# Patient Record
Sex: FEMALE | Race: BLACK OR AFRICAN AMERICAN | NOT HISPANIC OR LATINO | Employment: FULL TIME | URBAN - METROPOLITAN AREA
[De-identification: names, ages, dates, MRNs, and addresses within clinical notes are randomized per-mention and may not be internally consistent; named-entity substitution may affect disease eponyms.]

---

## 2016-04-07 LAB — PAP-ABSTRACT: NORMAL

## 2017-09-26 ENCOUNTER — OFFICE VISIT (OUTPATIENT)
Dept: URGENT CARE | Facility: RETAIL CLINIC | Age: 32
End: 2017-09-26
Payer: MEDICAID

## 2017-09-26 VITALS — SYSTOLIC BLOOD PRESSURE: 118 MMHG | TEMPERATURE: 98.6 F | DIASTOLIC BLOOD PRESSURE: 81 MMHG | HEART RATE: 87 BPM

## 2017-09-26 DIAGNOSIS — H65.02 ACUTE SEROUS OTITIS MEDIA OF LEFT EAR, RECURRENCE NOT SPECIFIED: Primary | ICD-10-CM

## 2017-09-26 PROCEDURE — 99203 OFFICE O/P NEW LOW 30 MIN: CPT | Performed by: PHYSICIAN ASSISTANT

## 2017-09-26 NOTE — PATIENT INSTRUCTIONS
Use Flonase as directed- 2 sprays in each nostril daily until symptoms resolve then continue with 1 spray in each nostril daily for 5 more days.  Ibuprofen as needed for pain relief and to reduce inflammation.  Warm compresses next to ear for pain relief.  Massage behind ear to encourage drainage.  Drink plenty of fluids and place a humidifier in bedroom.  Sudafed behind the pharmacist counter helps to dry up fluid in ears.  Follow up with primary care provider in 10-14 days with any concern of persistent pain.

## 2017-09-26 NOTE — MR AVS SNAPSHOT
"              After Visit Summary   2017    Kena Serna    MRN: [id number]           Patient Information     Date Of Birth          1985        Visit Information        Provider Department      2017 5:40 PM Deandra Santizo PA-C [facility]        Today's Diagnoses     Acute serous otitis media of left ear, recurrence not specified    -  1      Care Instructions    Use Flonase as directed- 2 sprays in each nostril daily until symptoms resolve then continue with 1 spray in each nostril daily for 5 more days.  Ibuprofen as needed for pain relief and to reduce inflammation.  Warm compresses next to ear for pain relief.  Massage behind ear to encourage drainage.  Drink plenty of fluids and place a humidifier in bedroom.  Sudafed behind the pharmacist counter helps to dry up fluid in ears.  Follow up with primary care provider in 10-14 days with any concern of persistent pain.          Follow-ups after your visit        Who to contact     You can reach your care team any time of the day by calling [phone].  Notification of test results:  If you have an abnormal lab result, we will notify you by phone as soon as possible.         Additional Information About Your Visit        [employer] Information     [employer] lets you send messages to your doctor, view your test results, renew your prescriptions, schedule appointments and more. To sign up, go to [email or web address] . Click on \"Log in\" on the left side of the screen, which will take you to the Welcome page. Then click on \"Sign up Now\" on the right side of the page.     You will be asked to enter the access code listed below, as well as some personal information. Please follow the directions to create your username and password.     Your access code is: [email or web address]  Expires: 2017  5:55 PM     Your access code will  in 90 days. If you need help or a new code, please call your [place] clinic or [phone].      "   Care EveryWhere ID     This is your Care EveryWhere ID. This could be used by other organizations to access your [facility] medical records  [id number]        Your Vitals Were     Pulse Temperature                87 98.6  F (37  C) (Temporal)           Blood Pressure from Last 3 Encounters:   09/26/17 118/81    Weight from Last 3 Encounters:   No data found for Wt              Today, you had the following     No orders found for display       Primary Care Provider    None Specified       No primary provider on file.        Equal Access to Services     : Hadii aad ku hadasho Soomaali, waaxda luqadaha, qaybta kaalmada adeegyada, waxay idiin hayaan adeeg leandrojuan figueroa . So [facility] [phone].    ATENCIÓN: Si habla español, tiene a foy disposición servicios gratuitos de asistencia lingüística. Llame al [phone].    We comply with applicable federal civil rights laws and [place] laws. We do not discriminate on the basis of race, color, national origin, age, disability sex, sexual orientation or gender identity.            Thank you!     Thank you for choosing [facility]  for your care. Our goal is always to provide you with excellent care. Hearing back from our patients is one way we can continue to improve our services. Please take a few minutes to complete the written survey that you may receive in the mail after your visit with us. Thank you!             Your Updated Medication List - Protect others around you: Learn how to safely use, store and throw away your medicines at www.disposemymeds.org.          This list is accurate as of: 9/26/17  5:55 PM.  Always use your most recent med list.                   Brand Name Dispense Instructions for use Diagnosis    CLARITIN PO           DAYQUIL OR           etonogestrel 68 MG Impl    IMPLANON/NEXPLANON     1 each by Subdermal route once        NYQUIL PO           ZOLOFT PO      Take by mouth daily

## 2017-09-26 NOTE — NURSING NOTE
Chief Complaint   Patient presents with     Otalgia     left ear pain x 5 days, sinus drainage since this last weekend, no fevers       Initial /81 (BP Location: Left arm)  Pulse 87  Temp 98.6  F (37  C) (Temporal) There is no height or weight on file to calculate BMI.  Medication Reconciliation: complete

## 2017-09-26 NOTE — PROGRESS NOTES
Chief Complaint   Patient presents with     Otalgia     left ear pain x 5 days, sinus drainage since this last weekend, no fevers     SUBJECTIVE:  Kena Serna is a 32 year old female who presents for evaluation of left ear pain for 5 days.   Severity: moderate   Timing: gradual onset  Additional symptoms include nasal congestion and post nasal drip.  Treatment measures tried include: Tylenol/Ibuprofen and Claritin.  History of recurrent otitis: no  Predisposing factors include: None.    No past medical history on file.  Current Outpatient Prescriptions   Medication Sig Dispense Refill     etonogestrel (IMPLANON/NEXPLANON) 68 MG IMPL 1 each by Subdermal route once       Sertraline HCl (ZOLOFT PO) Take by mouth daily       Loratadine (CLARITIN PO)        Pseudoeph-Doxylamine-DM-APAP (NYQUIL PO)        Pseudoephedrine-APAP-DM (DAYQUIL OR)        Social History   Substance Use Topics     Smoking status: Not on file     Smokeless tobacco: Not on file     Alcohol use Not on file     Allergies   Allergen Reactions     Ceclor [Cefaclor]      ROS:   Review of systems negative except as stated above.    OBJECTIVE:  /81 (BP Location: Left arm)  Pulse 87  Temp 98.6  F (37  C) (Temporal)   GENERAL: awake alert and in no acute distress  EARS:   Right TM in neutral position, translucent without scarring, effusion or erythema. Normal landmarks are visible. Auditory canal without drainage, edema, erythema.  Left TM in neutral position, translucent without scarring, with moderate serous/mucoid effusion with erythema. Normal landmarks are visible. Auditory canal without drainage, edema, erythema.  ENT: EOMI,  PERRL, conjunctiva clear. Nares bilaterally without edematous turbinates or discharge. Posterior pharynx is not erythematous.  NECK: supple, non-tender to palpation, no adenopathy noted.   RESP: lungs clear to auscultation - no rales, rhonchi or wheezes  CV: regular rates and rhythm, normal S1 S2, no murmur  noted    ASSESSMENT:    ICD-10-CM    1. Acute serous otitis media of left ear, recurrence not specified H65.02      PLAN:   Patient Instructions   Use Flonase as directed- 2 sprays in each nostril daily until symptoms resolve then continue with 1 spray in each nostril daily for 5 more days.  Ibuprofen as needed for pain relief and to reduce inflammation.  Warm compresses next to ear for pain relief.  Massage behind ear to encourage drainage.  Drink plenty of fluids and place a humidifier in bedroom.  Sudafed behind the pharmacist counter helps to dry up fluid in ears.  Follow up with primary care provider in 10-14 days with any concern of persistent pain.    Follow up with primary care provider with any problems, questions or concerns or if symptoms worsen or fail to improve. Patient agreed to plan and verbalized understanding.    Trisha Jungers, PA-C  [facility]

## 2017-11-08 ENCOUNTER — THERAPY VISIT (OUTPATIENT)
Dept: PHYSICAL THERAPY | Facility: CLINIC | Age: 32
End: 2017-11-08
Payer: COMMERCIAL

## 2017-11-08 DIAGNOSIS — M54.50 CHRONIC LEFT-SIDED LOW BACK PAIN WITHOUT SCIATICA: Primary | ICD-10-CM

## 2017-11-08 DIAGNOSIS — G89.29 CHRONIC LEFT-SIDED LOW BACK PAIN WITHOUT SCIATICA: Primary | ICD-10-CM

## 2017-11-08 PROCEDURE — 97110 THERAPEUTIC EXERCISES: CPT | Mod: GP | Performed by: PHYSICAL THERAPIST

## 2017-11-08 PROCEDURE — 97161 PT EVAL LOW COMPLEX 20 MIN: CPT | Mod: GP | Performed by: PHYSICAL THERAPIST

## 2017-11-08 NOTE — PROGRESS NOTES
[facility] Initial Evaluation      Subjective:    Patient is a 32 year old female presenting with rehab back hpi.   Kena Serna is a 32 year old female with a lumbar condition.  Occurance: pain began 4 years ago after having a difficutl pregnancy.  Condition occurred: other.  This is a chronic condition  11/3/17 date of MD order.    Patient reports pain:  Thoracic spine left and lumbar spine left.  Radiates to:  Gluteals left.  Quality: throbbing. and is constant and reported as 8/10.  Associated symptoms:  Loss of motion/stiffness. Pain is worse in the A.M. and worse during the day.  Exacerbated by: sitting for longer than 20 minutes, standing for longer than 20minutes. any prolonged position.  and relieved by nothing.  Since onset symptoms are gradually worsening.  Special tests:  X-ray (no signficant findings).  Previous treatment includes chiropractic (acupuncture, prednisone).  There was no improvement following previous treatment.    Pertinent medical history includes:  Depression and asthma.  Medical allergies: no.  Other surgeries include:  Other (wisdom teeth).  Current medications:  Anti-depressants and anti-inflammatory.  Current occupation is -.  Patient is working in normal job without restrictions.  Primary job tasks include:  Prolonged sitting and prolonged standing (computer work).    Barriers include:  None as reported by the patient.    Red flags:  None as reported by the patient.                        Objective:    Standing Alignment:          Pelvic:  PSIS high L                         Lumbar/SI Evaluation    Lumbar Myotomes:  normal            Lumbar DTR's:    L4 (Quad):  Left:  3   Right:  3  S1 (Achilles):  Left:  3   Right:  3    Lumbar Dermtomes:  not assessed (no reports of numbness tingling in legs. )                      Lumbar Provocation:    Left positive with:  PROM hip    Right negative with:  PROM hip    SI joint/Sacrum:        Left positive  at:    Thigh thrust and Sacral thrust    Right negative at:  Thigh thrust and Sacral thrust                                                   Clifford Lumbar Evaluation      Movement Loss:  Flexion (Flex): mod and pain  Extension (EXT): min  Side Glide R (SG R): nil  Side Glide L (SG L): min and pain  Test Movements:  FIS: During: increases  After: no worse  Pretest Movements: 5/10 left / central low back / SI pain,   Repeat FIS: During: increases  After: worse        EIL: During: increases  After: no worse    Repeat EIL: During: increases  After: no worse                                                   ROS    Assessment/Plan:      Patient is a 32 year old female with lumbar and sacral complaints.    Patient has the following significant findings with corresponding treatment plan.                Diagnosis 1:  Chronic left low back / SI pain  Pain -  hot/cold therapy, manual therapy, self management, education, directional preference exercise and home program  Decreased ROM/flexibility - manual therapy, therapeutic exercise, therapeutic activity and home program  Decreased joint mobility - manual therapy, therapeutic exercise, therapeutic activity and home program  Decreased strength - therapeutic exercise, therapeutic activities and home program  Decreased function - therapeutic activities and home program  Impaired posture - neuro re-education, therapeutic activities and home program    Therapy Evaluation Codes:   1) History comprised of:   Personal factors that impact the plan of care:      Time since onset of symptoms.    Comorbidity factors that impact the plan of care are:      Depression.     Medications impacting care: Anti-depressant.  2) Examination of Body Systems comprised of:   Body structures and functions that impact the plan of care:      Lumbar spine and Sacral illiac joint.   Activity limitations that impact the plan of care are:      Lifting, Sitting, Stairs, Walking and Working.  3) Clinical  presentation characteristics are:   Stable/Uncomplicated.  4) Decision-Making    Low complexity using standardized patient assessment instrument and/or measureable assessment of functional outcome.  Cumulative Therapy Evaluation is: Low complexity.    Previous and current functional limitations:  (See Goal Flow Sheet for this information)    Short term and Long term goals: (See Goal Flow Sheet for this information)     Communication ability:  Patient appears to be able to clearly communicate and understand verbal and written communication and follow directions correctly.  Treatment Explanation - The following has been discussed with the patient:   RX ordered/plan of care  Anticipated outcomes  Possible risks and side effects  This patient would benefit from PT intervention to resume normal activities.   Rehab potential is good.    Frequency:  2 X week, once daily  Duration:  for 6 weeks  Discharge Plan:  Achieve all LTG.  Independent in home treatment program.  Reach maximal therapeutic benefit.    Please refer to the daily flowsheet for treatment today, total treatment time and time spent performing 1:1 timed codes.

## 2017-11-08 NOTE — LETTER
[facility]  [address]  [facility] [address] [phone]    2017    Re: Kena Serna   :   1985  MRN:  [id number]   REFERRING PHYSICIAN:   Aj Harris    [facility]  Date of Initial Evaluation:  17  Visits:  Rxs Used: 1  Reason for Referral:  Chronic left-sided low back pain without sciatica    EVALUATION SUMMARY    [facility]    Subjective:    Patient is a 32 year old female presenting with rehab back hpi.   Kena Serna is a 32 year old female with a lumbar condition.  Occurance: pain began 4 years ago after having a difficutl pregnancy.  Condition occurred: other.  This is a chronic condition  11/3/17 date of MD order.    Patient reports pain:  Thoracic spine left and lumbar spine left.  Radiates to:  Gluteals left.  Quality: throbbing. and is constant and reported as 8/10.  Associated symptoms:  Loss of motion/stiffness. Pain is worse in the A.M. and worse during the day.  Exacerbated by: sitting for longer than 20 minutes, standing for longer than 20minutes. any prolonged position.  and relieved by nothing.  Since onset symptoms are gradually worsening.  Special tests:  X-ray (no signficant findings).  Previous treatment includes chiropractic (acupuncture, prednisone).  There was no improvement following previous treatment.    Pertinent medical history includes:  Depression and asthma.  Medical allergies: no.  Other surgeries include:  Other (wisdom teeth).  Current medications:  Anti-depressants and anti-inflammatory.  Current occupation is -.  Patient is working in normal job without restrictions.  Primary job tasks include:  Prolonged sitting and prolonged standing (computer work).    Barriers include:  None as reported by the patient.  Red flags:  None as reported by the patient.                  Objective:    Standing Alignment:    Pelvic:  PSIS high L       Lumbar/SI Evaluation    Lumbar Myotomes:  normal    Lumbar DTR's:    L4 (Quad):  Left:  3   Right:  3  S1 (Achilles):  Left:  3   Right:  3    Lumbar Dermtomes:  not assessed (no reports of numbness tingling in legs. )    Lumbar Provocation:    Left positive with:  PROM hip  Right negative with:  PROM hip    SI joint/Sacrum:      Left positive at:    Thigh thrust and Sacral thrust  Right negative at:  Thigh thrust and Sacral thrust    Clifford Lumbar Evaluation    Movement Loss:  Flexion (Flex): mod and pain  Extension (EXT): min  Side Glide R (SG R): nil  Side Glide L (SG L): min and pain    Test Movements:  FIS: During: increases  After: no worse  Pretest Movements: 5/10 left / central low back / SI pain,   Repeat FIS: During: increases  After: worse    EIL: During: increases  After: no worse    Repeat EIL: During: increases  After: no worse      Assessment/Plan:      Patient is a 32 year old female with lumbar and sacral complaints.    Patient has the following significant findings with corresponding treatment plan.                Diagnosis 1:  Chronic left low back / SI pain  Pain -  hot/cold therapy, manual therapy, self management, education, directional preference exercise and home program  Decreased ROM/flexibility - manual therapy, therapeutic exercise, therapeutic activity and home program  Decreased joint mobility - manual therapy, therapeutic exercise, therapeutic activity and home program  Decreased strength - therapeutic exercise, therapeutic activities and home program  Decreased function - therapeutic activities and home program  Impaired posture - neuro re-education, therapeutic activities and home program  Therapy Evaluation Codes:   1) History comprised of:   Personal factors that impact the plan of care:      Time since onset of symptoms.    Comorbidity factors that impact the plan of care are:      Depression.     Medications  impacting care: Anti-depressant.  2) Examination of Body Systems comprised of:   Body structures and functions that impact the plan of care:      Lumbar spine and Sacral illiac joint.   Activity limitations that impact the plan of care are:      Lifting, Sitting, Stairs, Walking and Working.  3) Clinical presentation characteristics are:   Stable/Uncomplicated.  4) Decision-Making    Low complexity using standardized patient assessment instrument and/or measureable assessment of functional outcome.  Cumulative Therapy Evaluation is: Low complexity.    Previous and current functional limitations:  (See Goal Flow Sheet for this information)    Short term and Long term goals: (See Goal Flow Sheet for this information)     Communication ability:  Patient appears to be able to clearly communicate and understand verbal and written communication and follow directions correctly.  Treatment Explanation - The following has been discussed with the patient:   RX ordered/plan of care  Anticipated outcomes  Possible risks and side effects  This patient would benefit from PT intervention to resume normal activities.   Rehab potential is good.    Frequency:  2 X week, once daily  Duration:  for 6 weeks  Discharge Plan:  Achieve all LTG.  Independent in home treatment program.  Reach maximal therapeutic benefit.      Thank you for your referral.    INQUIRIES  Therapist: Khoi Mark DPT  [facility]  [address] [place], [place] [address]  [facility] [address]  Phone: [phone]  Fax: [phone]

## 2017-11-08 NOTE — LETTER
[facility]  [address]  [facility] [id number] [phone]    2018    Re: Kena Serna   :   1985  MRN:  [id number]   REFERRING PHYSICIAN:   Aj Harris    [facility]  Date of Initial Evaluation:  17  Visits:  Rxs Used: 1  Reason for Referral:  Chronic left-sided low back pain without sciatica    DISCHARGE REPORT  Patient was seen in physical therapy for evaluation of low back pain on 17. Following initial visit, patient did not schedule any follow-up appointments so current status is unknown.  Will consequently discharge from physical therapy.      SUBJECTIVE  See Eval    Current Pain level: 10.     Initial Pain level: /10.   Changes in function:  unknown  Adverse reaction to treatment or activity: None    OBJECTIVE  See Eval     ASSESSMENT/PLAN  Updated problem list and treatment plan: Diagnosis 1:  Low back pain   STG/LTGs have been met or progress has been made towards goals:  unknown  Assessment of Progress: The patient has not returned to therapy. Current status is unknown.  Self Management Plans:  Patient has been instructed in a home treatment program.  I have re-evaluated this patient and find that the nature, scope, duration and intensity of the therapy is appropriate for the medical condition of the patient.  Kena continues to require the following intervention to meet STG and LTG's:  HEP    Recommendations:  Discharge Physical Therapy with home exercise program.       Thank you for your referral.    INQUIRIES  Therapist: Khoi Mark DPT  [facility]  [address]  [facility] [address]  Phone: [phone]  Fax: [phone]

## 2017-11-08 NOTE — MR AVS SNAPSHOT
After Visit Summary   11/8/2017    Kena Serna    MRN: [id number]           Patient Information     Date Of Birth          1985        Visit Information        Provider Department      11/8/2017 5:20 PM Khoi Mark, PT [facility]        Today's Diagnoses     Chronic left-sided low back pain without sciatica    -  1       Follow-ups after your visit        Your next 10 appointments already scheduled     Nov 10, 2017 11:40 AM CST   DIANA Spine with Jim Sumner PT   [facility] ([facility]  )    [address]  [place] [address]   [phone]            Nov 13, 2017  4:50 PM CST   DIANA Spine with Jim Sumner PT   [facility] ([facility]  )    [address]  [place] [address]   [phone]            Nov 22, 2017  2:40 PM CST   DIANA Spine with Khoi Mark, PT   [facility] ([facility]  )    [address]  [place] [address]   [phone]            Nov 24, 2017 10:20 AM CST   DIANA Spine with Jim Sumner PT   [facility] ([facility]  )    [address]  [place] [address]   [phone]              Who to contact     If you have questions or need follow up information about today's clinic visit or your schedule please contact [facility] PHYSICAL [facility]ERAPY directly at [phone].  Normal or non-critical lab and imaging results will be communicated to you by MyChart, letter or phone within 4 business days after the clinic has received the results. If you do not hear from us within 7 days, please contact the clinic through MyChart or phone. If you have a critical or abnormal lab result, we will notify you by phone as soon  "as possible.  Submit refill requests through [employer] or call your pharmacy and they will forward the refill request to us. Please allow 3 business days for your refill to be completed.          Additional Information About Your Visit        [employer] Information     [employer] lets you send messages to your doctor, view your test results, renew your prescriptions, schedule appointments and more. To sign up, go to [email or web address] . Click on \"Log in\" on the left side of the screen, which will take you to the Welcome page. Then click on \"Sign up Now\" on the right side of the page.     You will be asked to enter the access code listed below, as well as some personal information. Please follow the directions to create your username and password.     Your access code is: [email or web address]  Expires: 2017  4:55 PM     Your access code will  in 90 days. If you need help or a new code, please call your [place] clinic or [phone].        Care EveryWhere ID     This is your Care EveryWhere ID. This could be used by other organizations to access your [place] medical records  [id number]         Blood Pressure from Last 3 Encounters:   17 118/81    Weight from Last 3 Encounters:   No data found for Wt              We Performed the Following     HC PT EVAL, LOW COMPLEXITY     DIANA INITIAL EVAL REPORT     THERAPEUTIC EXERCISES        Primary Care Provider    None Specified       No primary provider on file.        Equal Access to Services     [facility] HospitalBRIANA : Hadii marcus Thomas, waaxda bere, qaybta boaldaren aguero . So [facility] [phone].    ATENCIÓN: Si habla español, tiene a foy disposición servicios gratuitos de asistencia lingüística. Samuel al [phone].    We comply with applicable federal civil rights laws and [place] laws. We do not discriminate on the basis of race, color, national origin, age, disability, sex, sexual orientation, or gender " identity.            Thank you!     Thank you for choosing [facility]  for your care. Our goal is always to provide you with excellent care. Hearing back from our patients is one way we can continue to improve our services. Please take a few minutes to complete the written survey that you may receive in the mail after your visit with us. Thank you!             Your Updated Medication List - Protect others around you: Learn how to safely use, store and throw away your medicines at www.disposemymeds.org.          This list is accurate as of: 11/8/17  6:35 PM.  Always use your most recent med list.                   Brand Name Dispense Instructions for use Diagnosis    CLARITIN PO           DAYQUIL OR           etonogestrel 68 MG Impl    IMPLANON/NEXPLANON     1 each by Subdermal route once        NYQUIL PO           ZOLOFT PO      Take by mouth daily

## 2017-12-14 ENCOUNTER — OFFICE VISIT (OUTPATIENT)
Dept: OBGYN | Facility: OTHER | Age: 32
End: 2017-12-14
Payer: COMMERCIAL

## 2017-12-14 VITALS — HEART RATE: 72 BPM | WEIGHT: 179.5 LBS | SYSTOLIC BLOOD PRESSURE: 120 MMHG | DIASTOLIC BLOOD PRESSURE: 74 MMHG

## 2017-12-14 DIAGNOSIS — Z30.46 NEXPLANON REMOVAL: Primary | ICD-10-CM

## 2017-12-14 DIAGNOSIS — Z30.017 NEXPLANON INSERTION: ICD-10-CM

## 2017-12-14 DIAGNOSIS — Z97.5 NEXPLANON IN PLACE: ICD-10-CM

## 2017-12-14 PROCEDURE — 11983 REMOVE/INSERT DRUG IMPLANT: CPT | Performed by: ADVANCED PRACTICE MIDWIFE

## 2017-12-14 NOTE — PROGRESS NOTES
NEXPLANON INSERTION PROCEDURE    Kena Serna is a 32 year old No obstetric history on file. who presents for Nexplanon insertion. Patient's last menstrual period was 12/11/2017 (exact date).  The patient is currently using Nexplanon  for contraception.     Tests:   Discussed risks of bleeding and infection with placement and the insertion procedure. Also discussed the possibility of irregular bleeding for 3-6 months and then often cessation of menses but possibility of continued abnormal bleeding. Small risk of migration of the Nexplanon or difficulty removing the Nexplanon. We also discussed possible side effects of weight gain, skin or hair changes, alterations in mood and increase in headaches.  Lasts for 3 years at which time she could have this one removed and another replaced. All questions answered and consent form signed.   Preprocedure medications: 1% plain lidocaine, 1-2 ml  Nexplanon Lot # [id number]  [id number]   [id number]      Exp date:11/19   NDC 8473-0652-18    All equipment required was ready and available.  Patients allergies were confirmed.  The patient was placed in the supine position with her left (non-dominant) arm flexed at the elbow, externally rotated, and placed with her wrist parallel to her ear.  The removal site was identified 6-8 cm above the elbow crease at the inner aspect overlying the bicepital groove.  The removal and reinsertion site was marked with a sterile marker. The direction of insertion was also indicated with a nevin 6-8 cm proximal in the bicepital groove.  The  area was cleaned with betadine swabs and anesthetized with 3 cc of 1% lidocaine without epinephrine.  A small skin incision made using a #11 blade.  The Nexplanon was gently manipulated until the tip was at the incision. The brown tip was grasped with a  Felicity clamp and was gently removed from the incision.  Both brown tips were inspected following removal and found to be completely intact.    The Nexplanon was  removed from its blister.  The needle shield was removed.  Counter-traction was applied to the skin at the marked needle insertion site.  The tip of the needle was inserted at the site, beveled side up, at a slight angle.  The applicator was then lowered to a horizontal position.  The needle was inserted to its full length, keeping the needle parallel to the surface of the skin and the skin tented.   The cannula was retracted against the obturator.  The 4 cm brown was palpated under the skin.  The patient also palpated the brown.  A pressure bandage was applied with sterile gauze. The patient was instructed to remove the bangage in several hours and replace with a band-aid.    The user card was filled out and given to the patient to keep.    PLAN:   The patient was asked to contact the clinic for any fever/chills/severe pelvic or abdominal pain or heavy bleeding.     FOLLOW-UP:  She was asked to follow up for any problems.

## 2017-12-14 NOTE — PATIENT INSTRUCTIONS
Leave the pressure dressing in place for 4-6 hours.  If you feel the dressing is too tight loosen it or remove it completely.  Leave the Band-Aid in place for 24 hours.  Change it if wet.   Leave the steri strip in place until it falls off by itself.  Call the clinic with fever, chills or bleeding from the site.   Use a back up method of birth control such as condoms or abstain from intercourse for 7 days.   Call the clinic for bleeding that is heavier than a normal period for you or if you experience severe pelvic pain.      What Nexplanon Users May Expect    For appropiate patients, Nexplanon is well tolerated and has a low early-removal rate.    Insertion site complications, such as prolonged pain or infection, are rare. Removal is occasionally difficult, and rarely requires a surgical procedure in the operating room.    Menstrual changes are common with Nexplanon. Bleeding may become more or less frequent or heavy, or absent. The bleeding pattern after the first three months is predictive of future bleeding, but the pattern may change at any time. Average bleeding is 18 days over 3 months. Over 50% of women experience rare over absent bleeding over the two year period, while 25% experience frequent or prolonged bleeding.    In clinical studies, users gained 3.7 pounds over two years. It is unknown what portion of this weight gain is related to Nexplanon    Women with a history of depressed mood may have worsening on Nexplanon, and may need to have the device removed.    Return to baseline ovulation patterns is seen 7-14 days after removal of Nexplanon.    Rarely, headaches and acne have also let to device removal.    Nexplanon may be less effective in women weight more than 130% of their ideal body weight.    Nexplanon does not protect against HIV or STDs.    Please call [facility] at [phone] if you have questions or concerns.    For more complete  information:  [email or web address]

## 2017-12-14 NOTE — MR AVS SNAPSHOT
After Visit Summary   12/14/2017    Kena Serna    MRN: [id number]           Patient Information     Date Of Birth          1985        Visit Information        Provider Department      12/14/2017 5:15 PM Ellie Boucher APRN [facility]        Today's Diagnoses     Nexplanon removal    -  1    Nexplanon insertion        Nexplanon in place          Care Instructions    Leave the pressure dressing in place for 4-6 hours.  If you feel the dressing is too tight loosen it or remove it completely.  Leave the Band-Aid in place for 24 hours.  Change it if wet.   Leave the steri strip in place until it falls off by itself.  Call the clinic with fever, chills or bleeding from the site.   Use a back up method of birth control such as condoms or abstain from intercourse for 7 days.   Call the clinic for bleeding that is heavier than a normal period for you or if you experience severe pelvic pain.      What Nexplanon Users May Expect    For appropiate patients, Nexplanon is well tolerated and has a low early-removal rate.    Insertion site complications, such as prolonged pain or infection, are rare. Removal is occasionally difficult, and rarely requires a surgical procedure in the operating room.    Menstrual changes are common with Nexplanon. Bleeding may become more or less frequent or heavy, or absent. The bleeding pattern after the first three months is predictive of future bleeding, but the pattern may change at any time. Average bleeding is 18 days over 3 months. Over 50% of women experience rare over absent bleeding over the two year period, while 25% experience frequent or prolonged bleeding.    In clinical studies, users gained 3.7 pounds over two years. It is unknown what portion of this weight gain is related to Nexplanon    Women with a history of depressed mood may have worsening on Nexplanon, and may need to have the device removed.    Return to baseline ovulation  "patterns is seen 7-14 days after removal of Nexplanon.    Rarely, headaches and acne have also let to device removal.    Nexplanon may be less effective in women weight more than 130% of their ideal body weight.    Nexplanon does not protect against HIV or STDs.    Please call [facility] at [phone] if you have questions or concerns.    For more complete information:  [email or web address]              Follow-ups after your visit        Who to contact     If you have questions or need follow up information about today's clinic visit or your schedule please contact [facility] directly at [phone].  Normal or non-critical lab and imaging results will be communicated to you by [employer], letter or phone within 4 business days after the clinic has received the results. If you do not hear from us within 7 days, please contact the clinic through [employer] or phone. If you have a critical or abnormal lab result, we will notify you by phone as soon as possible.  Submit refill requests through [employer] or call your pharmacy and they will forward the refill request to us. Please allow 3 business days for your refill to be completed.          Additional Information About Your Visit        [employer] Information     [employer] lets you send messages to your doctor, view your test results, renew your prescriptions, schedule appointments and more. To sign up, go to [email or web address] . Click on \"Log in\" on the left side of the screen, which will take you to the Welcome page. Then click on \"Sign up Now\" on the right side of the page.     You will be asked to enter the access code listed below, as well as some personal information. Please follow the directions to create your username and password.     Your access code is: [email or web address]  Expires: 2017  4:55 PM     Your access code will  in 90 days. If you need help or a new code, please call " your [place] clinic or [phone].        Care EveryWhere ID     This is your Care EveryWhere ID. This could be used by other organizations to access your [place] medical records  [id number]        Your Vitals Were     Pulse Last Period                72 12/11/2017 (Exact Date)           Blood Pressure from Last 3 Encounters:   12/14/17 120/74   09/26/17 118/81    Weight from Last 3 Encounters:   12/14/17 179 lb 8 oz (81.4 kg)              We Performed the Following     ETONOGESTREL IMPLANT SYSTEM     REMOVAL AND REINSERTION NEXPLANON          Today's Medication Changes          These changes are accurate as of: 12/14/17  5:34 PM.  If you have any questions, ask your nurse or doctor.               These medicines have changed or have updated prescriptions.        Dose/Directions    * etonogestrel 68 MG Impl   Commonly known as:  IMPLANON/NEXPLANON   This may have changed:  Another medication with the same name was added. Make sure you understand how and when to take each.   Changed by:  Deandra Santizo PA-C        Dose:  1 each   1 each by Subdermal route once   Refills:  0       * etonogestrel 68 MG Impl   Commonly known as:  IMPLANON/NEXPLANON   This may have changed:  You were already taking a medication with the same name, and this prescription was added. Make sure you understand how and when to take each.   Used for:  Nexplanon removal, Nexplanon insertion, Nexplanon in place   Changed by:  Ellie Boucher APRN CNM        Dose:  1 each   1 each (68 mg) by Subdermal route once for 1 dose   Quantity:  1 each   Refills:  0       * Notice:  This list has 2 medication(s) that are the same as other medications prescribed for you. Read the directions carefully, and ask your doctor or other care provider to review them with you.         Where to get your medicines      Some of these will need a paper prescription and others can be bought over the counter.  Ask your nurse if you have questions.     You  don't need a prescription for these medications     etonogestrel 68 MG Impl                Primary Care Provider Office Phone # Fax #    [facility] [phone]       [address] [place] [address]        Equal Access to Services     ELENA BELL : Hadbrayden knutson hadjohno Soomaali, waaxda luqadaha, qaybta kaalmada adeegyada, waxjesus idiin hayblazen adesharon love carolina gusman. So [facility] [phone].    ATENCIÓN: Si habla español, tiene a foy disposición servicios gratuitos de asistencia lingüística. Llame al [phone].    We comply with applicable federal civil rights laws and [place] laws. We do not discriminate on the basis of race, color, national origin, age, disability, sex, sexual orientation, or gender identity.            Thank you!     Thank you for choosing [facility]  for your care. Our goal is always to provide you with excellent care. Hearing back from our patients is one way we can continue to improve our services. Please take a few minutes to complete the written survey that you may receive in the mail after your visit with us. Thank you!             Your Updated Medication List - Protect others around you: Learn how to safely use, store and throw away your medicines at www.disposemymeds.org.          This list is accurate as of: 12/14/17  5:34 PM.  Always use your most recent med list.                   Brand Name Dispense Instructions for use Diagnosis    CLARITIN PO           CYMBALTA PO      Take 90 mg by mouth daily        DAYQUIL OR           * etonogestrel 68 MG Impl    IMPLANON/NEXPLANON     1 each by Subdermal route once        * etonogestrel 68 MG Impl    IMPLANON/NEXPLANON    1 each    1 each (68 mg) by Subdermal route once for 1 dose    Nexplanon removal, Nexplanon insertion, Nexplanon in place       NYQUIL PO           ZOLOFT PO      Take by mouth daily        * Notice:  This list has 2 medication(s) that are the same as other medications  prescribed for you. Read the directions carefully, and ask your doctor or other care provider to review them with you.

## 2017-12-14 NOTE — NURSING NOTE
Chief Complaint   Patient presents with     Contraception     remove and replace Nexplanon       Initial /74 (BP Location: Right arm, Patient Position: Chair, Cuff Size: Adult Regular)  Pulse 72  Wt 179 lb 8 oz (81.4 kg)  LMP 12/11/2017 (Exact Date) There is no height or weight on file to calculate BMI.  Medication Reconciliation: complete   Juanita Bradley CMA

## 2018-01-23 PROBLEM — G89.29 CHRONIC LEFT-SIDED LOW BACK PAIN WITHOUT SCIATICA: Status: RESOLVED | Noted: 2017-11-08 | Resolved: 2018-01-23

## 2018-01-23 PROBLEM — M54.50 CHRONIC LEFT-SIDED LOW BACK PAIN WITHOUT SCIATICA: Status: RESOLVED | Noted: 2017-11-08 | Resolved: 2018-01-23

## 2018-01-23 NOTE — PROGRESS NOTES
Subjective:  HPI  Oswestry Score: 50 %                 Objective:  System    Physical Exam    General     ROS    Assessment/Plan:    DISCHARGE REPORT    Patient was seen in physical therapy for evaluation of low back pain on 11/8/17. Following initial visit, patient did not schedule any follow-up appointments so current status is unknown.  Will consequently discharge from physical therapy.        SUBJECTIVE  See Eval    Current Pain level: 5/10.     Initial Pain level: 7/10.   Changes in function:  unknown  Adverse reaction to treatment or activity: None    OBJECTIVE  See Eval     ASSESSMENT/PLAN  Updated problem list and treatment plan: Diagnosis 1:  Low back pain   STG/LTGs have been met or progress has been made towards goals:  unknown  Assessment of Progress: The patient has not returned to therapy. Current status is unknown.  Self Management Plans:  Patient has been instructed in a home treatment program.  I have re-evaluated this patient and find that the nature, scope, duration and intensity of the therapy is appropriate for the medical condition of the patient.  Kena continues to require the following intervention to meet STG and LTG's:  HEP    Recommendations:  Discharge Physical Therapy with home exercise program.       Please refer to the daily flowsheet for treatment today, total treatment time and time spent performing 1:1 timed codes.

## 2018-03-28 ENCOUNTER — OFFICE VISIT (OUTPATIENT)
Dept: ORTHOPEDICS | Facility: OTHER | Age: 33
End: 2018-03-28
Payer: COMMERCIAL

## 2018-03-28 ENCOUNTER — RADIANT APPOINTMENT (OUTPATIENT)
Dept: GENERAL RADIOLOGY | Facility: OTHER | Age: 33
End: 2018-03-28
Attending: ORTHOPAEDIC SURGERY
Payer: COMMERCIAL

## 2018-03-28 VITALS — TEMPERATURE: 97.1 F | WEIGHT: 190 LBS | HEIGHT: 62 IN | BODY MASS INDEX: 34.96 KG/M2

## 2018-03-28 DIAGNOSIS — M79.644 PAIN OF FINGER OF RIGHT HAND: ICD-10-CM

## 2018-03-28 DIAGNOSIS — M25.641 STIFFNESS OF FINGER JOINT, RIGHT: Primary | ICD-10-CM

## 2018-03-28 PROCEDURE — 73140 X-RAY EXAM OF FINGER(S): CPT | Mod: RT

## 2018-03-28 PROCEDURE — 99203 OFFICE O/P NEW LOW 30 MIN: CPT | Performed by: ORTHOPAEDIC SURGERY

## 2018-03-28 ASSESSMENT — PAIN SCALES - GENERAL: PAINLEVEL: MODERATE PAIN (5)

## 2018-03-28 NOTE — PROGRESS NOTES
Kena Serna is a 32 year old female who is seen in consultation at the request of .  History of Present illness:  Kena presents for evaluation of:  1.) rt hand pinky finger  Onset: has always been like this but got worse in the last 2 months  Symptoms brought on by maybe over use.   Character:  dull ache.    Progression of symptoms:  worse.    Previous similar pain: no .   Pain Level:  5/10.   Previous treatments:  rest/inactivity, ice, massage, stretching, Ibuprofen and oral prednisone.  Currently on Blood thinners? no  Diagnosis of Diabetes? no

## 2018-03-28 NOTE — NURSING NOTE
".  Chief Complaint   Patient presents with     Consult     rt hand pinky finger        Initial Temp 97.1  F (36.2  C)  Ht 1.575 m (5' 2\")  Wt 86.2 kg (190 lb)  BMI 34.75 kg/m2 Estimated body mass index is 34.75 kg/(m^2) as calculated from the following:    Height as of this encounter: 1.575 m (5' 2\").    Weight as of this encounter: 86.2 kg (190 lb).  Medication Reconciliation: complete    BP completed using cuff size: NA (Not Taken)    Mikaela Malik MA      "

## 2018-03-28 NOTE — PATIENT INSTRUCTIONS
Encounter Diagnoses   Name Primary?     Pain of finger of right hand      Stiffness of finger joint, right Yes     Rest, ice and elevate above heart level as needed for pain control  1.  X-ray: We can see from x-ray that you have some calcification or bone growth over the second joint in your pinky finger on the palm side of your hand.  2.  On exam I can actually move that joint, he does not have full range of motion but it does move.  3.  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  4.  We talked about hand therapy but you would like to proceed to see a hand surgeon possibly.  They might recommend the same treatment of hand therapy before surgery.  5.  There is a good chance that hand therapy could benefit you since the joint does move slightly.  6.  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  7. We did a referral to hand surgery today.  We have people in [place].    8. Follow up with Rosita Littlejohn MD and/or Manolo Trujillo PA-C on an as needed basis. Feel free to call us if you want hand therapy also.  [employer] and [employer] may offer reliable information regarding your diagnosis and treatment plan.    THANK YOU for coming in today. If you receive a survey via [employer] or mail please let us know if there was anything you especially appreciated today or if there is any way we can improve our clinic. We appreciate your input.    GENERAL INFORMATION:  Our hours are:  Monday :     Clinic 7:30 AM-430 PM ([facility])  Tuesday:      Operating Room All Day ([facility])  Wednesday: Clinic 7:30 AM - 11:15 AM ([facility])             Clinic 1:00 PM - 4:00PM ([facility])  Thursday:     Administrative Day  Friday:          Clinic 7:30 AM - 11:15 AM ([facility])            Clinic 1:00 PM - 4:00 PM ([place]  [facility])      Bone and Joint Service Line for any issues or concerns: [phone]      We are not in the office Thursdays. Therefore non- urgent calls and medical messages received on Thursday will be addressed when we are back in the office on Wednesday. Urgent matters will be reviewed and addressed by one of our partners in the office as needed.    If lab work was done today as part of your evaluation you will generally be contacted via [employer], mail, or phone with the results within 1-5 days. If there is an alarming result we will contact you by phone. Lab results come back at varying times, I generally wait until all labs are resulted before making comments on results. Please note labs are automatically released to [employer]ar (if you have signed up for it) once available-at times you may see these prior to my having a chance to review them as well.    If you need refills please contact your pharmacist. They will send a refill request to me to review. Please allow 3 business days for us to process all refill requests. All narcotic refills should be handled in the clinic at the time of your visit.

## 2018-03-28 NOTE — PROGRESS NOTES
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  She is also going to school at [employer] getting her masters degree and she has 5 kids and enjoys hunting.  She does archery.    She is being seen for   Chief Complaints and History of Present Illnesses   Patient presents with     Consult     rt hand pinky finger          History of Present Illness:   Kena Serna is a 32 year old female who is seen in consultation at the request of .  History of Present illness:  Kena presents for evaluation of:  1.) rt hand pinky finger, at the PIP joint and also at the base of the digit  Onset: has always been like this but got worse in the last 2 months.  She does not recall a specific injury to the finger.  She did not have any surgery on the finger.  Symptoms brought on by maybe over use.  Patient states that she has noticed more trouble with this finger in the last 2 months as she has been teaching and writing on a chalkboard and using her hand more.  Character:  dull ache.    Progression of symptoms:  worse.    Previous similar pain: no .   Pain Level:  5/10.   Previous treatments:  rest/inactivity, ice, massage, stretching, Ibuprofen and oral prednisone.  Nothing has given her much benefit.  She has not tried any injections or any formal therapy.  She has not seen a hand specialist or hand surgeon.  Currently on Blood thinners? no  Diagnosis of Diabetes? 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    Patient's past medical, surgical, social and family histories reviewed.     No past medical history on file.      No past surgical history on file.    Medications:    Current Outpatient Prescriptions on File Prior  "to Visit:  DULoxetine HCl (CYMBALTA PO) Take 90 mg by mouth daily   etonogestrel (IMPLANON/NEXPLANON) 68 MG IMPL 1 each by Subdermal route once   Sertraline HCl (ZOLOFT PO) Take by mouth daily   Loratadine (CLARITIN PO)    Pseudoeph-Doxylamine-DM-APAP (NYQUIL PO)    Pseudoephedrine-APAP-DM (DAYQUIL OR)      No current facility-administered medications on file prior to visit.     Allergies   Allergen Reactions     Ceclor [Cefaclor]        Social History     Occupational History     Not on file.     Social History Main Topics     Smoking status: Never Smoker     Smokeless tobacco: Never Used     Alcohol use No     Drug use: No     Sexual activity: Yes     Partners: Male     Birth control/ protection: Implant       Family History   Problem Relation Age of Onset     Unknown/Adopted Father      No pertinent family history    REVIEW OF SYSTEMS  10 point review systems performed otherwise negative as noted as per history of present illness.    Physical Exam:  Vitals: Temp 97.1  F (36.2  C)  Ht 1.575 m (5' 2\")  Wt 86.2 kg (190 lb)  BMI 34.75 kg/m2  BMI= Body mass index is 34.75 kg/(m^2).    Constitutional: healthy, alert and no acute distress   Psychiatric: mentation appears normal and affect normal/bright  NEURO: no focal deficits, CMS intact right upper extremity  RESP: Normal with easy respirations and no use of accessory muscles to breathe, no audible wheezing or retractions  CV: +2 radial pulse and her hand is warm to plapation.  Also the patient's capillary refill in the right fifth digit is less than 1 second  SKIN: No erythema, rashes, excoriation, or breakdown. No evidence of infection.   MUSCULOSKELETAL:    INSPECTION of right fifth digit: We do see a flexion contracture of the PIP joint of the fifth digit.  No other gross deformities, erythema, edema, ecchymosis, atrophy or fasciculations.     PALPATION: Slight tenderness to palpation over the PIP joint, no tenderness on palpation of the DIP joint or MCP " joint.  No tenderness to palpation of the hand wrist or elbow.  No increased warmth noted.    ROM: I am able to flex the digit to 90  and extend to approximately 5  short of full extension at the PIP joint so the joint does move.  There is pain with range of motion but no catching or locking.  Full flexion and extension of the DIP joint and MCP joint without pain.     STRENGTH: 5 out of 5  strength and extension of the digit    SPECIAL TEST: None  GAIT: non-antalgic  Lymph: no palpable lymph nodes    Diagnostic Modalities:  Radiographs done today 3 views of the right fifth digit AP lateral and oblique showing mineralization or calcification on the volar side of the PIP joint and not on the dorsal side.  Joint is at approximately a 45  angle.  No fracture dislocation or tumor noticed no arthritis noticed.  Independent visualization of the images was performed.    Impression: 1.  Right fifth digit stiffness at PIP joint.  2.  Right fifth digit calcification to volar side of PIP joint.  3.  Right fifth digit pain     Plan:  All of the above pertinent physical exam and imaging modalities findings was reviewed with Kena.                                          CONSERVATIVE CARE:    Patient Instructions:  1.  X-ray: We can see from x-ray that you have some calcification or bone growth over the second joint in your pinky finger on the palm side of your hand.  2.  On exam I can actually move that joint, he does not have full range of motion but it does move.  3.  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  4.  We talked about hand therapy but you would like to proceed to see a hand surgeon possibly.  They might recommend the same treatment of hand therapy before surgery.  5.  There is a good chance that hand therapy could benefit you since the joint does move slightly.  6.  We know that you have 5 kids and are teaching and also doing school so you have limited  time.  7. We did a referral to hand surgery today.  We have people in [place].    8. Follow up with Rosita Littlejohn MD and/or Manolo Trujillo PA-C on an as needed basis. Feel free to call us if you want hand therapy also.  Re-x-ray on return: No    Scribed by Manolo Trujillo PA-C on 3/28/2018 at 11:08 AM, based on Dr. Rosita Littlejohn's statements to me.    This note was dictated with [employer].    ROGERS Medina MD

## 2018-03-28 NOTE — MR AVS SNAPSHOT
After Visit Summary   3/28/2018    Kena Serna    MRN: [id number]           Patient Information     Date Of Birth          1985        Visit Information        Provider Department      3/28/2018 10:40 AM Rosita Littlejohn MD [facility]        Today's Diagnoses     Stiffness of finger joint, right    -  1    Pain of finger of right hand          Care Instructions    Encounter Diagnoses   Name Primary?     Pain of finger of right hand      Stiffness of finger joint, right Yes     Rest, ice and elevate above heart level as needed for pain control  1.  X-ray: We can see from x-ray that you have some calcification or bone growth over the second joint in your pinky finger on the palm side of your hand.  2.  On exam I can actually move that joint, he does not have full range of motion but it does move.  3.  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  4.  We talked about hand therapy but you would like to proceed to see a hand surgeon possibly.  They might recommend the same treatment of hand therapy before surgery.  5.  There is a good chance that hand therapy could benefit you since the joint does move slightly.  6.  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  7. We did a referral to hand surgery today.  We have people in [place].    8. Follow up with Rosita Littlejohn MD and/or Manolo Trujillo PA-C on an as needed basis. Feel free to call us if you want hand therapy also.  [employer] and [employer] may offer reliable information regarding your diagnosis and treatment plan.    THANK YOU for coming in today. If you receive a survey via [employer] or mail please let us know if there was anything you especially appreciated today or if there is any way we can improve our clinic. We appreciate your input.    GENERAL INFORMATION:  Our hours are:  Monday :     Clinic 7:30 AM-430 PM  ([facility])  Tuesday:      Operating Room All Day ([facility])  Wednesday: Clinic 7:30 AM - 11:15 AM ([facility])             Clinic 1:00 PM - 4:00PM ([facility])  Thursday:     Administrative Day  Friday:          Clinic 7:30 AM - 11:15 AM ([facility])            Clinic 1:00 PM - 4:00 PM ([facility])      Bone and Joint Service Line for any issues or concerns: [phone]      We are not in the office Thursdays. Therefore non- urgent calls and medical messages received on Thursday will be addressed when we are back in the office on Wednesday. Urgent matters will be reviewed and addressed by one of our partners in the office as needed.    If lab work was done today as part of your evaluation you will generally be contacted via [employer]icel, mail, or phone with the results within 1-5 days. If there is an alarming result we will contact you by phone. Lab results come back at varying times, I generally wait until all labs are resulted before making comments on results. Please note labs are automatically released to [employer]icel (if you have signed up for it) once available-at times you may see these prior to my having a chance to review them as well.    If you need refills please contact your pharmacist. They will send a refill request to me to review. Please allow 3 business days for us to process all refill requests. All narcotic refills should be handled in the clinic at the time of your visit.           Follow-ups after your visit        Who to contact     If you have questions or need follow up information about today's clinic visit or your schedule please contact [facility] directly at [phone].  Normal or non-critical lab and imaging results will be communicated to you by HemoShearhart, letter or phone within 4 business days after the clinic has received the results. If you do  "not hear from us within 7 days, please contact the clinic through Auxmoney or phone. If you have a critical or abnormal lab result, we will notify you by phone as soon as possible.  Submit refill requests through [employer]money or call your pharmacy and they will forward the refill request to us. Please allow 3 business days for your refill to be completed.          Additional Information About Your Visit        [email or web address] Information     Auxmoney lets you send messages to your doctor, view your test results, renew your prescriptions, schedule appointments and more. To sign up, go to [email or web address] . Click on \"Log in\" on the left side of the screen, which will take you to the Welcome page. Then click on \"Sign up Now\" on the right side of the page.     You will be asked to enter the access code listed below, as well as some personal information. Please follow the directions to create your username and password.     Your access code is: [email or web address]  Expires: 2018 11:13 AM     Your access code will  in 90 days. If you need help or a new code, please call your [place] clinic or [phone].        Care EveryWhere ID     This is your Care EveryWhere ID. This could be used by other organizations to access your [place] medical records  [id number]        Your Vitals Were     Temperature Height BMI (Body Mass Index)             97.1  F (36.2  C) 1.575 m (5' 2\") 34.75 kg/m2          Blood Pressure from Last 3 Encounters:   17 120/74   17 118/81    Weight from Last 3 Encounters:   18 86.2 kg (190 lb)   17 81.4 kg (179 lb 8 oz)               Primary Care Provider Office Phone # Fax #    [facility] [phone]       [address] [place] [address]        Equal Access to Services     ELENA BELL : Bonita Thomas, mona luqanahi, qaybdaren funez. [facility] [phone].    ATENCIÓN: Si gloria " español, tiene a foy disposición servicios gratuitos de asistencia lingüística. Samuel dumont [phone].    We comply with applicable federal civil rights laws and [place] laws. We do not discriminate on the basis of race, color, national origin, age, disability, sex, sexual orientation, or gender identity.            Thank you!     Thank you for choosing [facility]  for your care. Our goal is always to provide you with excellent care. Hearing back from our patients is one way we can continue to improve our services. Please take a few minutes to complete the written survey that you may receive in the mail after your visit with us. Thank you!             Your Updated Medication List - Protect others around you: Learn how to safely use, store and throw away your medicines at www.disposemymeds.org.          This list is accurate as of 3/28/18 11:13 AM.  Always use your most recent med list.                   Brand Name Dispense Instructions for use Diagnosis    CLARITIN PO           CYMBALTA PO      Take 90 mg by mouth daily        DAYQUIL OR           etonogestrel 68 MG Impl    IMPLANON/NEXPLANON     1 each by Subdermal route once        NYQUIL PO           ZOLOFT PO      Take by mouth daily

## 2018-03-28 NOTE — LETTER
3/28/2018         RE: Kena Serna  [address] [facility] [address]        Dear Colleague,    Thank you for referring your patient, Kena Serna, to the [facility]. Please see a copy of my visit note below.    Kena Serna is a 32 year old female who is seen in consultation at the request of .  History of Present illness:  Kena presents for evaluation of:  1.) rt hand pinky finger  Onset: has always been like this but got worse in the last 2 months  Symptoms brought on by maybe over use.   Character:  dull ache.    Progression of symptoms:  worse.    Previous similar pain: no .   Pain Level:  5/10.   Previous treatments:  rest/inactivity, ice, massage, stretching, Ibuprofen and oral prednisone.  Currently on Blood thinners? no  Diagnosis of Diabetes? 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  She is also going to school at [employer] getting her masters degree and she has 5 kids and enjoys hunting.  She does archery.    She is being seen for   Chief Complaints and History of Present Illnesses   Patient presents with     Consult     rt hand pinky finger          History of Present Illness:   Kena Serna is a 32 year old female who is seen in consultation at the request of .  History of Present illness:  Kena presents for evaluation of:  1.) rt hand pinky finger, at the PIP joint and also at the base of the digit  Onset: has always been like this but got worse in the last 2 months.  She does not recall a specific injury to the finger.  She did not have any surgery on the finger.  Symptoms brought on by maybe over use.  Patient states that she has noticed more trouble with this finger in the last 2 months as she has been teaching and writing on a chalkboard and using her hand more.  Character:  " dull ache.    Progression of symptoms:  worse.    Previous similar pain: no .   Pain Level:  5/10.   Previous treatments:  rest/inactivity, ice, massage, stretching, Ibuprofen and oral prednisone.  Nothing has given her much benefit.  She has not tried any injections or any formal therapy.  She has not seen a hand specialist or hand surgeon.  Currently on Blood thinners? no  Diagnosis of Diabetes? 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    Patient's past medical, surgical, social and family histories reviewed.     No past medical history on file.      No past surgical history on file.    Medications:    Current Outpatient Prescriptions on File Prior to Visit:  DULoxetine HCl (CYMBALTA PO) Take 90 mg by mouth daily   etonogestrel (IMPLANON/NEXPLANON) 68 MG IMPL 1 each by Subdermal route once   Sertraline HCl (ZOLOFT PO) Take by mouth daily   Loratadine (CLARITIN PO)    Pseudoeph-Doxylamine-DM-APAP (NYQUIL PO)    Pseudoephedrine-APAP-DM (DAYQUIL OR)      No current facility-administered medications on file prior to visit.     Allergies   Allergen Reactions     Ceclor [Cefaclor]        Social History     Occupational History     Not on file.     Social History Main Topics     Smoking status: Never Smoker     Smokeless tobacco: Never Used     Alcohol use No     Drug use: No     Sexual activity: Yes     Partners: Male     Birth control/ protection: Implant       Family History   Problem Relation Age of Onset     Unknown/Adopted Father      No pertinent family history    REVIEW OF SYSTEMS  10 point review systems performed otherwise negative as noted as per history of present illness.    Physical Exam:  Vitals: Temp 97.1  F (36.2  C)  Ht 1.575 m (5' 2\")  Wt 86.2 kg (190 lb)  BMI 34.75 kg/m2  BMI= Body mass index is 34.75 kg/(m^2).    Constitutional: " healthy, alert and no acute distress   Psychiatric: mentation appears normal and affect normal/bright  NEURO: no focal deficits, CMS intact right upper extremity  RESP: Normal with easy respirations and no use of accessory muscles to breathe, no audible wheezing or retractions  CV: +2 radial pulse and her hand is warm to plapation.  Also the patient's capillary refill in the right fifth digit is less than 1 second  SKIN: No erythema, rashes, excoriation, or breakdown. No evidence of infection.   MUSCULOSKELETAL:    INSPECTION of right fifth digit: We do see a flexion contracture of the PIP joint of the fifth digit.  No other gross deformities, erythema, edema, ecchymosis, atrophy or fasciculations.     PALPATION: Slight tenderness to palpation over the PIP joint, no tenderness on palpation of the DIP joint or MCP joint.  No tenderness to palpation of the hand wrist or elbow.  No increased warmth noted.    ROM: I am able to flex the digit to 90  and extend to approximately 5  short of full extension at the PIP joint so the joint does move.  There is pain with range of motion but no catching or locking.  Full flexion and extension of the DIP joint and MCP joint without pain.     STRENGTH: 5 out of 5  strength and extension of the digit    SPECIAL TEST: None  GAIT: non-antalgic  Lymph: no palpable lymph nodes    Diagnostic Modalities:  Radiographs done today 3 views of the right fifth digit AP lateral and oblique showing mineralization or calcification on the volar side of the PIP joint and not on the dorsal side.  Joint is at approximately a 45  angle.  No fracture dislocation or tumor noticed no arthritis noticed.  Independent visualization of the images was performed.    Impression: 1.  Right fifth digit stiffness at PIP joint.  2.  Right fifth digit calcification to volar side of PIP joint.  3.  Right fifth digit pain     Plan:  All of the above pertinent physical exam and imaging modalities findings was  reviewed with Kena.                                          CONSERVATIVE CARE:    Patient Instructions:  1.  X-ray: We can see from x-ray that you have some calcification or bone growth over the second joint in your pinky finger on the palm side of your hand.  2.  On exam I can actually move that joint, he does not have full range of motion but it does move.  3.  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  4.  We talked about hand therapy but you would like to proceed to see a hand surgeon possibly.  They might recommend the same treatment of hand therapy before surgery.  5.  There is a good chance that hand therapy could benefit you since the joint does move slightly.  6.  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  7. We did a referral to hand surgery today.  We have people in [place].    8. Follow up with Rosita Littlejohn MD and/or Manolo Trujillo PA-C on an as needed basis. Feel free to call us if you want hand therapy also.  Re-x-ray on return: No    Scribed by Manolo Trujillo PA-C on 3/28/2018 at 11:08 AM, based on Dr. Rosita Littlejohn's statements to me.    This note was dictated with "[employer].    ROGERS Medina MD      Again, thank you for allowing me to participate in the care of your patient.        Sincerely,        Rosita Littlejohn MD

## 2018-04-02 DIAGNOSIS — M25.641 STIFFNESS OF FINGER JOINT, RIGHT: Primary | ICD-10-CM

## 2018-07-05 ENCOUNTER — THERAPY VISIT (OUTPATIENT)
Dept: PHYSICAL THERAPY | Facility: CLINIC | Age: 33
End: 2018-07-05
Payer: COMMERCIAL

## 2018-07-05 DIAGNOSIS — M72.2 PLANTAR FASCIITIS: Primary | ICD-10-CM

## 2018-07-05 PROCEDURE — 97110 THERAPEUTIC EXERCISES: CPT | Mod: GP | Performed by: PHYSICAL THERAPIST

## 2018-07-05 PROCEDURE — 97140 MANUAL THERAPY 1/> REGIONS: CPT | Mod: GP | Performed by: PHYSICAL THERAPIST

## 2018-07-05 PROCEDURE — 97161 PT EVAL LOW COMPLEX 20 MIN: CPT | Mod: GP | Performed by: PHYSICAL THERAPIST

## 2018-07-05 ASSESSMENT — ACTIVITIES OF DAILY LIVING (ADL)
TOTAL_ADL_ITEM_SCORE:: 37
ACTIVITIES_OF_DAILY_LIVING_MEASURE_SCORE(%):: 44.05
HIGHEST_POTENTIAL_ADL_SCORE:: 84

## 2018-07-05 NOTE — MR AVS SNAPSHOT
After Visit Summary   7/5/2018    Kena Tamayo    MRN: [id number]           Patient Information     Date Of Birth          1985        Visit Information        Provider Department      7/5/2018 3:50 PM Jim Sumner, PT [facility] Physical Therapy        Today's Diagnoses     Plantar fasciitis    -  1       Follow-ups after your visit        Your next 10 appointments already scheduled     Jul 09, 2018  4:10 PM CDT   DIANA Extremity with Rahel Quintanilla [facility] Therapy ([facility]  )    [address]  [place] [address]   [phone]            Jul 19, 2018  3:50 PM CDT   DIANA Extremity with Jim Sumner PT   [facility] ([facility]  )    [address]  [place] [address]   [phone]            Jul 26, 2018  4:30 PM CDT   DIANA Extremity with Jim Sumner PT   [facility] ([facility]  )    [address]  [place] [address]   [phone]            Aug 03, 2018  3:30 PM CDT   DIANA Extremity with Jim Sumner PT   [facility] ([facility]  )    [address]  [facility] [address]   [phone]              Who to contact     If you have questions or need follow up information about today's clinic visit or your schedule please contact [facility] directly at [phone].  Normal or non-critical lab and imaging results will be communicated to you by MyChart, letter or phone within 4 business days after the clinic has received the results. If you do not hear from us within 7 days, please contact the clinic through MyChart or phone. If you have a critical or abnormal lab result, we will notify you by phone as soon as  "possible.  Submit refill requests through [employer] or call your pharmacy and they will forward the refill request to us. Please allow 3 business days for your refill to be completed.          Additional Information About Your Visit        [employer] Information     [employer] lets you send messages to your doctor, view your test results, renew your prescriptions, schedule appointments and more. To sign up, go to [email or web address] . Click on \"Log in\" on the left side of the screen, which will take you to the Welcome page. Then click on \"Sign up Now\" on the right side of the page.     You will be asked to enter the access code listed below, as well as some personal information. Please follow the directions to create your username and password.     Your access code is: [email or web address]  Expires: 10/3/2018  6:40 PM     Your access code will  in 90 days. If you need help or a new code, please call your [facility] clinic or [phone].        Care EveryWhere ID     This is your Care EveryWhere ID. This could be used by other organizations to access your [facility] medical records  [id number]         Blood Pressure from Last 3 Encounters:   17 120/74   17 118/81    Weight from Last 3 Encounters:   18 86.2 kg (190 lb)   17 81.4 kg (179 lb 8 oz)              We Performed the Following     HC PT EVAL, LOW COMPLEXITY     DIANA INITIAL EVAL REPORT     MANUAL THER TECH,1+REGIONS,EA 15 MIN     THERAPEUTIC EXERCISES        Primary Care Provider Office Phone # Fax #    [facility] [phone]       [address] [place] [address]        Equal Access to Services     ELENA BELL : Hadii marcus Thomas, waaxda luqadaha, qaybta kaalmada cyndi, daren gusman. So [facility] [phone].    ATENCIÓN: Si habla español, tiene a foy disposición servicios gratuitos de asistencia lingüística. Llame al [phone].    We comply with applicable federal " civil rights laws and [place] laws. We do not discriminate on the basis of race, color, national origin, age, disability, sex, sexual orientation, or gender identity.            Thank you!     Thank you for choosing [facility]  for your care. Our goal is always to provide you with excellent care. Hearing back from our patients is one way we can continue to improve our services. Please take a few minutes to complete the written survey that you may receive in the mail after your visit with us. Thank you!             Your Updated Medication List - Protect others around you: Learn how to safely use, store and throw away your medicines at www.disposemymeds.org.          This list is accurate as of 7/5/18  6:40 PM.  Always use your most recent med list.                   Brand Name Dispense Instructions for use Diagnosis    CLARITIN PO           CYMBALTA PO      Take 90 mg by mouth daily        DAYQUIL OR           etonogestrel 68 MG Impl    IMPLANON/NEXPLANON     1 each by Subdermal route once        NYQUIL PO           ZOLOFT PO      Take by mouth daily

## 2018-07-05 NOTE — PROGRESS NOTES
[facility] Initial Evaluation  Subjective:  Patient is a 32 year old female presenting with rehab left ankle/foot hpi. The history is provided by the patient. No  was used.   Kena Tamayo is a 32 year old female with a left foot condition.  Condition occurred with:  Insidious onset.  Condition occurred: for unknown reasons.  This is a new condition  Kena started to have foot pain in early June, 2018. She says it took about 3 weeks before it started to jennifer to the point where it was unbearable and she knew she needed to see the doctor. The pain is constant and it is only hurting right on the bottom of her left heel. She describes the pain as being so intense that she doesn't even want to put weight on her L foot when she is wearing her boot. She was referred for physical therapy on 7/3/2018.   . .    Patient reports pain:  Other (plantar fascia insertion).    Pain is described as aching, burning, sharp and cramping and is constant and reported as 6/10 (highest: 9/10, lowest: 4/10).  Associated symptoms:  Tingling. Pain is worse in the A.M..  Symptoms are exacerbated by activity, ascending stairs, descending stairs, standing, weight bearing, walking and bending/squatting and relieved by rest, NSAID's and analgesics.  Since onset symptoms are gradually worsening.        General health as reported by patient is good.  Pertinent medical history includes:  Depression, mental illness and asthma.  Medical allergies: no.  Other surgeries include:  Other (wisdom teeth).  Current medications:  Anti-depressants.  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 In general, she would like to be able to go walking for at least 20 minutes too.      .  Patient is working in normal job without restrictions.  Primary job tasks include:  Prolonged  sitting, prolonged standing and repetitive tasks.    Barriers include:  None as reported by patient.    Red flags:  None as reported by patient.                        Objective:  Kena Tamayo , : 1985, MRN: [id number]  Physical Therapy Objective Findings  Subjective information, goals, clinical impression, daily documentation and other information found in EPISODES tab.  Ankle Objective Findings  Objective:   GAIT: antalgic, walking on lateral aspect of L foot, supinating foot excessively to do so, slowed, short stride  Foot Position in Standing: Supinated excessively due to pain of FWB on L foot  Range of Motion:    Right AROM       Right PROM        Left AROM        Left PROM          DF(KE) 1  7 deg of PF      DF(KF) 14    11 decreased   PF 67  57    Calcaneal Inv   Decreased  Decreased   Calcaneal Ever   decreased Decreased   Great toe flex       Great toe ext       Other:         Manual Muscle Testing  (graded 0-5, measured at 0 degrees unless otherwise noted):                                                              Right                                                Left                                                  DF 5 3        PF/Inv  3   PF/Ever  3   PF     Other:     (+ mild pain, ++ moderate pain, +++ severe pain)    Special Tests:                                                                                                                   Right                                            Left                                                 Dorsiflexion/Eversion Test     Windlass Test - -   Longitudinal Arch Angle     Anterior Drawer     Posterior Drawer     Talar Tilt     ER Test       Edema:                                                            Right                                                Left                                                 Figure 8 (cm)       Balance      Single Leg Balance                           (30 seconds) Right                                                       Left                                                    Eyes open     Eyes closed       Flexibility                                                    Right                                                      Left                                                    Gastroc (in WB) Decreased Decreased    Soleus (in WB) Decreased Decreased     Joint Mobility Testing:  Subtalar Jt                                                                                                                                              Rear Foot 50% restriction L calcaneal eversion   Midfoot 50% restriction L cuboid-4th metatarsal, L cuneiform-3rd metatarsal   Forefoot    Tibia/Fibula        Palpation: Tender at plantar fascia origin        System    Physical Exam    General     ROS    Assessment/Plan:    Patient is a 32 year old female with left side foot complaints.    Patient has the following significant findings with corresponding treatment plan.                Diagnosis 1:  Kena presents with L foot pain with signs and symptoms consistent with plantar fasciitis and L abductor hallucis irritation  Pain -  hot/cold therapy, US, electric stimulation, mechanical traction, manual therapy, splint/taping/bracing/orthotics, self management, education and home program  Decreased ROM/flexibility - manual therapy, therapeutic exercise, therapeutic activity and home program  Decreased strength - therapeutic exercise, therapeutic activities and home program  Inflammation - cold therapy, US, electric stimulation, iontophoresis and self management/home program  Impaired gait - gait training, assistive devices and home program  Impaired muscle performance - electric stimulation, neuro re-education and home program  Decreased function - therapeutic activities, home program and functional performance testing    Therapy Evaluation Codes:   1) History comprised of:   Personal factors that impact the plan of care:      On  feet at work and busy with 5 kids at home.    Comorbidity factors that impact the plan of care are:      Asthma, Depression and Mental illness.     Medications impacting care: Anti-depressant.  2) Examination of Body Systems comprised of:   Body structures and functions that impact the plan of care:      Foot.   Activity limitations that impact the plan of care are:      Bending, Running, Squatting/kneeling, Stairs, Standing, Walking and Working.  3) Clinical presentation characteristics are:   Stable/Uncomplicated.  4) Decision-Making    Low complexity using standardized patient assessment instrument and/or measureable assessment of functional outcome.  Cumulative Therapy Evaluation is: Low complexity.    Previous and current functional limitations:  (See Goal Flow Sheet for this information)    Short term and Long term goals: (See Goal Flow Sheet for this information)     Communication ability:  Patient appears to be able to clearly communicate and understand verbal and written communication and follow directions correctly.  Treatment Explanation - The following has been discussed with the patient:   RX ordered/plan of care  Anticipated outcomes  Possible risks and side effects  This patient would benefit from PT intervention to resume normal activities.   Rehab potential is good.    Frequency:  1 X week, once daily  Duration:  for 8 weeks  Discharge Plan:  Achieve all LTG.  Independent in home treatment program.  Reach maximal therapeutic benefit.    Please refer to the daily flowsheet for treatment today, total treatment time and time spent performing 1:1 timed codes.     Mandy Malik, SHANE Sumner, DPT, OCS

## 2018-07-05 NOTE — LETTER
[facility]  [address]  [facility] [id number] [phone]    2018    Re: Kena Tamayo   :   1985  MRN:  [id number]   REFERRING PHYSICIAN:   Leonard Moya    [facility]  Date of Initial Evaluation:  18  Visits:  Rxs Used: 1  Reason for Referral:  Plantar fasciitis    EVALUATION SUMMARY    [facility] Evaluation  Subjective:  Patient is a 32 year old female presenting with rehab left ankle/foot hpi. The history is provided by the patient. No  was used.   Kena Tamayo is a 32 year old female with a left foot condition.  Condition occurred with:  Insidious onset.  Condition occurred: for unknown reasons.  This is a new condition  Kena started to have foot pain in early . She says it took about 3 weeks before it started to jennifer to the point where it was unbearable and she knew she needed to see the doctor. The pain is constant and it is only hurting right on the bottom of her left heel. She describes the pain as being so intense that she doesn't even want to put weight on her L foot when she is wearing her boot. She was referred for physical therapy on 7/3/2018.       Patient reports pain:  Other (plantar fascia insertion).    Pain is described as aching, burning, sharp and cramping and is constant and reported as 6/10 (highest: 9/10, lowest: 4/10).  Associated symptoms:  Tingling. Pain is worse in the A.M..  Symptoms are exacerbated by activity, ascending stairs, descending stairs, standing, weight bearing, walking and bending/squatting and relieved by rest, NSAID's and analgesics.  Since onset symptoms are gradually worsening.        General health as reported by patient is good.  Pertinent medical history includes:  Depression, mental illness and asthma.  Medical allergies: no.  Other surgeries include:  Other  (wisdom teeth).  Current medications:  Anti-depressants.  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 In general, she would like to be able to go walking for at least 20 minutes too.     Patient is working in normal job without restrictions.  Primary job tasks include:  Prolonged sitting, prolonged standing and repetitive tasks.    Barriers include:  None as reported by patient.  Red flags:  None as reported by patient.    Objective:  Kena Tamayo , : 1985, MRN: [id number]  Physical Therapy Objective Findings  Subjective information, goals, clinical impression, daily documentation and other information found in EPISODES tab.  Ankle Objective Findings  Objective:   GAIT: antalgic, walking on lateral aspect of L foot, supinating foot excessively to do so, slowed, short stride  Foot Position in Standing: Supinated excessively due to pain of FWB on L foot  Range of Motion:    Right AROM       Right PROM        Left AROM        Left PROM          DF(KE) 1  7 deg of PF      DF(KF) 14    11 decreased   PF 67  57    Calcaneal Inv   Decreased  Decreased   Calcaneal Ever   decreased Decreased   Great toe flex       Great toe ext       Other:         Manual Muscle Testing  (graded 0-5, measured at 0 degrees unless otherwise noted):                                                              Right                                                Left                                                  DF 5 3        PF/Inv  3   PF/Ever  3   PF     Other:     (+ mild pain, ++ moderate pain, +++ severe pain)    Special Tests:                                                                                                                   Right                                            Left                                                 Dorsiflexion/Eversion Test     Windlass Test -  -   Longitudinal Arch Angle     Anterior Drawer     Posterior Drawer     Talar Tilt     ER Test       Edema:                                                            Right                                                Left                                                 Figure 8 (cm)       Balance      Single Leg Balance                           (30 seconds) Right                                                      Left                                                    Eyes open     Eyes closed       Flexibility                                                    Right                                                      Left                                                    Gastroc (in WB) Decreased Decreased    Soleus (in WB) Decreased Decreased     Joint Mobility Testing:  Subtalar Jt                                                                                                                                              Rear Foot 50% restriction L calcaneal eversion   Midfoot 50% restriction L cuboid-4th metatarsal, L cuneiform-3rd metatarsal   Forefoot    Tibia/Fibula        Palpation: Tender at plantar fascia origin    Assessment/Plan:    Patient is a 32 year old female with left side foot complaints.    Patient has the following significant findings with corresponding treatment plan.                Diagnosis 1:  Kena presents with L foot pain with signs and symptoms consistent with plantar fasciitis and L abductor hallucis irritation  Pain -  hot/cold therapy, US, electric stimulation, mechanical traction, manual therapy, splint/taping/bracing/orthotics, self management, education and home program  Decreased ROM/flexibility - manual therapy, therapeutic exercise, therapeutic activity and home program  Decreased strength - therapeutic exercise, therapeutic activities and home program  Inflammation - cold therapy, US, electric stimulation, iontophoresis and self management/home program  Impaired  gait - gait training, assistive devices and home program  Impaired muscle performance - electric stimulation, neuro re-education and home program  Decreased function - therapeutic activities, home program and functional performance testing    Therapy Evaluation Codes:   1) History comprised of:   Personal factors that impact the plan of care:      On feet at work and busy with 5 kids at home.    Comorbidity factors that impact the plan of care are:      Asthma, Depression and Mental illness.     Medications impacting care: Anti-depressant.  2) Examination of Body Systems comprised of:   Body structures and functions that impact the plan of care:      Foot.   Activity limitations that impact the plan of care are:      Bending, Running, Squatting/kneeling, Stairs, Standing, Walking and Working.  3) Clinical presentation characteristics are:   Stable/Uncomplicated.  4) Decision-Making    Low complexity using standardized patient assessment instrument and/or measureable assessment of functional outcome.  Cumulative Therapy Evaluation is: Low complexity.    Previous and current functional limitations:  (See Goal Flow Sheet for this information)    Short term and Long term goals: (See Goal Flow Sheet for this information)     Communication ability:  Patient appears to be able to clearly communicate and understand verbal and written communication and follow directions correctly.  Treatment Explanation - The following has been discussed with the patient:   RX ordered/plan of care  Anticipated outcomes  Possible risks and side effects  This patient would benefit from PT intervention to resume normal activities.   Rehab potential is good.    Frequency:  1 X week, once daily  Duration:  for 8 weeks  Discharge Plan:  Achieve all LTG.  Independent in home treatment program.  Reach maximal therapeutic benefit.        Thank you for your referral.    INQUIRIES  Therapist: Mandy Malik, SPT                     Jim Sumner DPT,  [facility]      [facility]  [address]  [facility] [address]  Phone: [phone]  Fax: [phone]

## 2018-10-01 ENCOUNTER — OFFICE VISIT (OUTPATIENT)
Dept: FAMILY MEDICINE | Facility: CLINIC | Age: 33
End: 2018-10-01
Payer: COMMERCIAL

## 2018-10-01 ENCOUNTER — TELEPHONE (OUTPATIENT)
Dept: FAMILY MEDICINE | Facility: OTHER | Age: 33
End: 2018-10-01

## 2018-10-01 VITALS
TEMPERATURE: 99.3 F | BODY MASS INDEX: 37.13 KG/M2 | DIASTOLIC BLOOD PRESSURE: 81 MMHG | WEIGHT: 203 LBS | HEART RATE: 117 BPM | OXYGEN SATURATION: 97 % | SYSTOLIC BLOOD PRESSURE: 128 MMHG

## 2018-10-01 DIAGNOSIS — J06.9 VIRAL UPPER RESPIRATORY TRACT INFECTION: ICD-10-CM

## 2018-10-01 DIAGNOSIS — R06.2 WHEEZING: Primary | ICD-10-CM

## 2018-10-01 PROCEDURE — 99213 OFFICE O/P EST LOW 20 MIN: CPT | Performed by: NURSE PRACTITIONER

## 2018-10-01 RX ORDER — PREDNISONE 20 MG/1
20 TABLET ORAL 2 TIMES DAILY
Qty: 10 TABLET | Refills: 0 | Status: SHIPPED | OUTPATIENT
Start: 2018-10-01 | End: 2018-10-06

## 2018-10-01 RX ORDER — ALBUTEROL SULFATE 90 UG/1
1-2 AEROSOL, METERED RESPIRATORY (INHALATION) EVERY 6 HOURS
Qty: 1 INHALER | Refills: 0 | Status: SHIPPED | OUTPATIENT
Start: 2018-10-01

## 2018-10-01 NOTE — MR AVS SNAPSHOT
After Visit Summary   10/1/2018    Kena Tamayo    MRN: [id number]           Patient Information     Date Of Birth          1985        Visit Information        Provider Department      10/1/2018 2:20 PM Aby Henry APRN CNP [facility]        Today's Diagnoses     Wheezing    -  1    Viral upper respiratory tract infection           Follow-ups after your visit        Who to contact     If you have questions or need follow up information about today's clinic visit or your schedule please contact [facility] directly at [phone].  Normal or non-critical lab and imaging results will be communicated to you by MyChart, letter or phone within 4 business days after the clinic has received the results. If you do not hear from us within 7 days, please contact the clinic through MyChart or phone. If you have a critical or abnormal lab result, we will notify you by phone as soon as possible.  Submit refill requests through [employer] or call your pharmacy and they will forward the refill request to us. Please allow 3 business days for your refill to be completed.          Additional Information About Your Visit        Care EveryWhere ID     This is your Care EveryWhere ID. This could be used by other organizations to access your [facility] medical records  [id number]        Your Vitals Were     Pulse Temperature Pulse Oximetry BMI (Body Mass Index)          117 99.3  F (37.4  C) (Oral) 97% 37.13 kg/m2         Blood Pressure from Last 3 Encounters:   10/01/18 128/81   12/14/17 120/74   09/26/17 118/81    Weight from Last 3 Encounters:   10/01/18 203 lb (92.1 kg)   03/28/18 190 lb (86.2 kg)   12/14/17 179 lb 8 oz (81.4 kg)              Today, you had the following     No orders found for display         Today's Medication Changes          These changes are accurate as of 10/1/18  2:48 PM.  If you have any questions, ask your nurse or doctor.               Start taking  these medicines.        Dose/Directions    albuterol 108 (90 Base) MCG/ACT inhaler   Commonly known as:  PROAIR HFA/PROVENTIL HFA/VENTOLIN HFA   Used for:  Wheezing   Started by:  Aby Henry APRN CNP        Dose:  1-2 puff   Inhale 1-2 puffs into the lungs every 6 hours   Quantity:  1 Inhaler   Refills:  0       predniSONE 20 MG tablet   Commonly known as:  DELTASONE   Used for:  Wheezing   Started by:  Aby Henry APRN CNP        Dose:  20 mg   Take 1 tablet (20 mg) by mouth 2 times daily for 5 days   Quantity:  10 tablet   Refills:  0            Where to get your medicines      These medications were sent to [facility] [address] [place] [address] [facility] [address] & [facility]  [address] [facility] [address]     Phone:  [phone]     albuterol 108 (90 Base) MCG/ACT inhaler    predniSONE 20 MG tablet                Primary Care Provider Office Phone # Fax #    [facility] [phone]       [address] [place] [address]        Equal Access to Services     YADIEL [facility] : Hadii marcus navarreteo Soromelia, waaxda luqadaha, qaybta kaalmada adeegyada, daren figueroa . So [facility] [phone].    ATENCIÓN: Si habla español, tiene a foy disposición servicios gratuitos de asistencia lingüística. [facility] [phone].    We comply with applicable federal civil rights laws and [place] laws. We do not discriminate on the basis of race, color, national origin, age, disability, sex, sexual orientation, or gender identity.            Thank you!     Thank you for choosing [facility]  for your care. Our goal is always to provide you with excellent care. Hearing back from our patients is one way we can continue to improve our services. Please take a few minutes to complete the written survey that you may receive in the mail after your visit with us. Thank you!             Your Updated Medication List - Protect  others around you: Learn how to safely use, store and throw away your medicines at www.disposemymeds.org.          This list is accurate as of 10/1/18  2:48 PM.  Always use your most recent med list.                   Brand Name Dispense Instructions for use Diagnosis    albuterol 108 (90 Base) MCG/ACT inhaler    PROAIR HFA/PROVENTIL HFA/VENTOLIN HFA    1 Inhaler    Inhale 1-2 puffs into the lungs every 6 hours    Wheezing       CLARITIN PO           CYMBALTA PO      Take 90 mg by mouth daily        DAYQUIL OR           etonogestrel 68 MG Impl    IMPLANON/NEXPLANON     1 each by Subdermal route once        NYQUIL PO           predniSONE 20 MG tablet    DELTASONE    10 tablet    Take 1 tablet (20 mg) by mouth 2 times daily for 5 days    Wheezing       ZOLOFT PO      Take by mouth daily

## 2018-10-01 NOTE — TELEPHONE ENCOUNTER
Kena Tamayo is a 33 year old female who presents with cough, chest discomfort with deep breaths and walking long distances.    NURSING ASSESSMENT:  Description:  Has not been feeling well. Having a dry productive cough. Unknown color of mucous. Feels tight in the chest and wheezy at times. Chest hurts when walking and taking deep breaths. Lightheaded when walking long distances or upstairs. Denies shortness of breath, asthma, fever, difficulty breathing.   Onset/duration:  2-3 days  Precip. factors:  Works at a school   Associated symptoms:  Cough, runny nose  Improves/worsens symptoms:  Same   Pain scale (0-10)   Varies with cough   LMP/preg/breast feeding:  No LMP recorded.  Last exam/Treatment:  12/14/2017  Allergies:   Allergies   Allergen Reactions     Ceclor [Cefaclor]      NURSING PLAN: Nursing advice to patient to be seen today, scheduled for earliest OV able to find that worked for patient     RECOMMENDED DISPOSITION:  See in 24 hours  Will comply with recommendation: Yes  If further questions/concerns or if symptoms do not improve, worsen or new symptoms develop, call your PCP or [facility] as soon as possible.    NOTES:  Disposition was determined by the first positive assessment question, therefore all previous assessment questions were negative    Guideline used:  Telephone Triage Protocols for Nurses, Fifth Edition, Anna Villeda  Cough  Nursing Judgment     Nuha Perez, RN, BSN

## 2018-10-01 NOTE — PROGRESS NOTES
SUBJECTIVE:   Kena Tamayo is a 33 year old female who presents to clinic today for the following health issues:      RESPIRATORY SYMPTOMS      Duration: X 3 days    Description  nasal congestion, rhinorrhea, cough and chest congestion, feels like not getting full breath - lightheaded when that happens    Severity: moderate    Accompanying signs and symptoms: None    History (predisposing factors):  none    Precipitating or alleviating factors: None    Therapies tried and outcome:  Ibuprofen, inhaler    Problem list and histories reviewed & adjusted, as indicated.  Additional history: as documented    Patient Active Problem List   Diagnosis     Nexplanon in place     Cervical cancer screening     Plantar fasciitis     No past surgical history on file.    Social History   Substance Use Topics     Smoking status: Never Smoker     Smokeless tobacco: Never Used     Alcohol use No     Family History   Problem Relation Age of Onset     Unknown/Adopted Father            Reviewed and updated as needed this visit by clinical staff  Allergies  Meds       Reviewed and updated as needed this visit by Provider         ROS:  Constitutional, HEENT, cardiovascular, pulmonary, GI, , musculoskeletal, neuro, skin, endocrine and psych systems are negative, except as otherwise noted.    OBJECTIVE:     /81  Pulse 117  Temp 99.3  F (37.4  C) (Oral)  Wt 203 lb (92.1 kg)  SpO2 97%  BMI 37.13 kg/m2  Body mass index is 37.13 kg/(m^2).  GENERAL: alert and no distress  EYES: Eyes grossly normal to inspection, PERRL and conjunctivae and sclerae normal  HENT: ear canals and TM's normal, nose and mouth without ulcers or lesions  NECK: no adenopathy, no asymmetry, masses, or scars and thyroid normal to palpation  RESP: lungs clear to auscultation - no rales, rhonchi or wheezes  RESP: expiratory wheezes throughout  CV: regular rate and rhythm, normal S1 S2, no S3 or S4, no murmur, click or rub, no peripheral edema and  peripheral pulses strong  SKIN: no suspicious lesions or rashes    Diagnostic Test Results:  none     ASSESSMENT/PLAN:     (R06.2) Wheezing  (primary encounter diagnosis)    Plan: predniSONE (DELTASONE) 20 MG tablet BID X 5 days   albuterol (PROAIR HFA/PROVENTIL HFA/VENTOLIN HFA) 108 (90Base) MCG/ACT inhaler (refilled, to be used prn)    (J06.9) Viral upper respiratory tract infection  Discussed home care, fluids, rest    Emergent symptoms as discussed to ER  Not improving follow up with pcp    See Patient Instructions    CHRISTOS Ramos [facility]

## 2018-11-07 ENCOUNTER — OFFICE VISIT (OUTPATIENT)
Dept: OBGYN | Facility: CLINIC | Age: 33
End: 2018-11-07
Payer: COMMERCIAL

## 2018-11-07 ENCOUNTER — RADIANT APPOINTMENT (OUTPATIENT)
Dept: ULTRASOUND IMAGING | Facility: CLINIC | Age: 33
End: 2018-11-07
Attending: NURSE PRACTITIONER
Payer: COMMERCIAL

## 2018-11-07 ENCOUNTER — MYC MEDICAL ADVICE (OUTPATIENT)
Dept: OBGYN | Facility: CLINIC | Age: 33
End: 2018-11-07

## 2018-11-07 VITALS
DIASTOLIC BLOOD PRESSURE: 75 MMHG | HEIGHT: 62 IN | SYSTOLIC BLOOD PRESSURE: 116 MMHG | BODY MASS INDEX: 38.2 KG/M2 | WEIGHT: 207.6 LBS | TEMPERATURE: 97.4 F | OXYGEN SATURATION: 99 % | HEART RATE: 77 BPM

## 2018-11-07 DIAGNOSIS — N94.6 DYSMENORRHEA: ICD-10-CM

## 2018-11-07 DIAGNOSIS — N83.202 CYST OF LEFT OVARY: Primary | ICD-10-CM

## 2018-11-07 DIAGNOSIS — N92.0 MENORRHAGIA WITH REGULAR CYCLE: ICD-10-CM

## 2018-11-07 DIAGNOSIS — N94.6 DYSMENORRHEA: Primary | ICD-10-CM

## 2018-11-07 PROCEDURE — 99213 OFFICE O/P EST LOW 20 MIN: CPT | Performed by: NURSE PRACTITIONER

## 2018-11-07 PROCEDURE — 76856 US EXAM PELVIC COMPLETE: CPT

## 2018-11-07 PROCEDURE — 76830 TRANSVAGINAL US NON-OB: CPT

## 2018-11-07 RX ORDER — TOPIRAMATE 100 MG/1
100 TABLET, FILM COATED ORAL 2 TIMES DAILY
COMMUNITY
End: 2021-01-05 | Stop reason: ALTCHOICE

## 2018-11-07 RX ORDER — ARIPIPRAZOLE 5 MG/1
7.5 TABLET ORAL DAILY
COMMUNITY
End: 2022-07-13

## 2018-11-07 ASSESSMENT — PAIN SCALES - GENERAL: PAINLEVEL: MODERATE PAIN (5)

## 2018-11-07 NOTE — MR AVS SNAPSHOT
After Visit Summary   11/7/2018    Kena Tamayo    MRN: [id number]           Patient Information     Date Of Birth          1985        Visit Information        Provider Department      11/7/2018 10:30 AM Radha Lee APRN CNP [facility]        Today's Diagnoses     Dysmenorrhea    -  1    Menorrhagia with regular cycle           Follow-ups after your visit        Your next 10 appointments already scheduled     Nov 07, 2018  1:00 PM CST   US PELVIC COMPLETE W TRANSVAGINAL with BEUS1   [facility] [place] ([facility])    [address]  [place] [address]   [phone]           How do I prepare for my exam? (Food and drink instructions) Adults: Drink four 8-ounce glasses of fluid an hour before your exam. If you need to empty your bladder before your exam, try to release only a little urine. Then, drink another glass of fluid.  Children: * Children who are potty trained up to 6 years old should drink at least 2 cups (16 oz) of water/non-carbonated beverage 30 minutes prior to the exam. * Children who are 6-10 years should drink at least 3 cups (24 oz) of water/non-carbonated beverage 45 minutes prior to the exam. * Children who are 10 years or older should drink at least 4 cups (32 oz) of water/non-carbonated beverage 45 minutes prior to the exam.  If your child is very uncomfortable or has an urgent need to pee, please notify a technologist; they will try to find out how much longer the wait may be and provide instructions to help relieve the pressure.  What should I wear: Wear comfortable clothes.  How long does the exam take: Most ultrasounds take 30 to 60 minutes.  What should I bring: Bring a list of your medicines, including vitamins, minerals and over-the-counter drugs. It is safest to leave personal items at home.  Do I need a :  No  is needed.  What do I need to tell my doctor: Tell your doctor about any allergies you  may have.  What should I do after the exam: No restrictions, You may resume normal activities.  What is this test: An ultrasound uses sound waves to make pictures of the body. Sound waves do not cause pain. The only discomfort may be the pressure of the wand against your skin or full bladder.  Who should I call with questions: If you have any questions, please call the Imaging Department where you will have your exam. Directions, parking instructions, and other information is available on our website, [email or web address].              Who to contact     If you have questions or need follow up information about today's clinic visit or your schedule please contact [facility] directly at [phone].  Normal or non-critical lab and imaging results will be communicated to you by MyChart, letter or phone within 4 business days after the clinic has received the results. If you do not hear from us within 7 days, please contact the clinic through [employer] or phone. If you have a critical or abnormal lab result, we will notify you by phone as soon as possible.  Submit refill requests through [employer] or call your pharmacy and they will forward the refill request to us. Please allow 3 business days for your refill to be completed.          Additional Information About Your Visit        [employer] Information     [employer] gives you secure access to your electronic health record. If you see a primary care provider, you can also send messages to your care team and make appointments. If you have questions, please call your primary care clinic.  If you do not have a primary care provider, please call [phone] and they will assist you.        Care EveryWhere ID     This is your Care EveryWhere ID. This could be used by other organizations to access your [facility] medical records  [id number]        Your Vitals Were     Pulse Temperature Height Pulse Oximetry BMI (Body Mass Index)       77 97.4  F (36.3  C) (Oral) 5'  "2\" (1.575 m) 99% 37.97 kg/m2        Blood Pressure from Last 3 Encounters:   11/07/18 116/75   10/01/18 128/81   12/14/17 120/74    Weight from Last 3 Encounters:   11/07/18 207 lb 9.6 oz (94.2 kg)   10/01/18 203 lb (92.1 kg)   03/28/18 190 lb (86.2 kg)              We Performed the Following     US Pelvic Complete with Transvaginal          Today's Medication Changes          These changes are accurate as of 11/7/18 11:27 AM.  If you have any questions, ask your nurse or doctor.               Stop taking these medicines if you haven't already. Please contact your care team if you have questions.     CLARITIN PO   Stopped by:  Radha Lee APRN CNP           DAYQUIL OR   Stopped by:  Radha Lee APRN CNP           NYQUIL PO   Stopped by:  Radha Lee APRN CNP           ZOLOFT PO   Stopped by:  Radha Lee APRN CNP                    Primary Care Provider Office Phone # Fax #    [facility] [phone]       [address] [place] [address]        Equal Access to Services     ELENA BELL : Hadii marcus [place]son hadasho Soomaali, waaxda luqadaha, qaybta kaalmada adeegyada, daren gusman. So [facility] [phone].    ATENCIÓN: Si habla español, tiene a foy disposición servicios gratuitos de asistencia lingüística. [facility] [phone].    We comply with applicable federal civil rights laws and [place] laws. We do not discriminate on the basis of race, color, national origin, age, disability, sex, sexual orientation, or gender identity.            Thank you!     [facility]  [facility] your [facility]. Our goal is always to provide you with excellent care. Hearing back from our patients is one way we can continue to improve our services. Please take a few minutes to complete the written survey that you may receive in the mail after your visit with us. Thank you!             Your Updated Medication List - " Protect others around you: Learn how to safely use, store and throw away your medicines at www.disposemymeds.org.          This list is accurate as of 11/7/18 11:27 AM.  Always use your most recent med list.                   Brand Name Dispense Instructions for use Diagnosis    albuterol 108 (90 Base) MCG/ACT inhaler    PROAIR HFA/PROVENTIL HFA/VENTOLIN HFA    1 Inhaler    Inhale 1-2 puffs into the lungs every 6 hours    Wheezing       ARIPiprazole 5 MG tablet    ABILIFY     Take 5 mg by mouth        CYMBALTA PO      Take 90 mg by mouth daily        etonogestrel 68 MG Impl    IMPLANON/NEXPLANON     1 each by Subdermal route once        TOPAMAX 100 MG tablet   Generic drug:  topiramate      Take 100 mg by mouth 2 times daily

## 2018-11-07 NOTE — PROGRESS NOTES
SUBJECTIVE:   Kena Tamayo is a 33 year old female who presents to clinic today for the following health issues:    Dysmenorrhea and heavy cycles    Patient has a long standing history of painful and heavy menstrual cycles. Has tried and failed several method of hormonal management.  In the past, has done combined oral contraceptive pills, which resulted in a pregnancy. Has had a Mirena IUD inserted x 3 and expelled all during heavy bleeding. Is on her second Nexplanon now-no bleeding at all with the first one, it was replaced last December and she has monthly bleeding that seems to be getting progressively more painful, heavier and lasting longer. Estimates they last at least 7 days, but many are up to 10 days. 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 Pain is resistant to NSAIDS for the most part. Was seen in Urgent Care and then ED yesterday for right sided pain and menses began again last night.  Pain in Urgent Care was lower right side, was sent to ED and by then pain had moved to upper right quadrant. Abdominal ultrasound did not have clear visualization of gall bladder, but was otherwise unremarkable. Labs normal. No further imaging was done, menses began then after she went home. Pain now is lower, generalized over pelvis, radiates to her upper legs as well. Nausea and fatigue, pain with BM as well.   They are likely done with having children, have 2 children together and her  has 3 children from previous relationships.     Problem list and histories reviewed & adjusted, as indicated.  Additional history: as documented    Patient Active Problem List   Diagnosis     Nexplanon in place     Cervical cancer screening     Plantar fasciitis     Past Surgical History:   Procedure Laterality Date     NO HISTORY OF SURGERY         Social History   Substance Use Topics     Smoking status: Never Smoker     Smokeless tobacco: Never Used     Alcohol use No     " Family History   Problem Relation Age of Onset     Unknown/Adopted Father            Reviewed and updated as needed this visit by clinical staff       Reviewed and updated as needed this visit by Provider         ROS:  Constitutional, HEENT, cardiovascular, pulmonary, gi and gu systems are negative, except as otherwise noted.    OBJECTIVE:     /75  Pulse 77  Temp 97.4  F (36.3  C) (Oral)  Ht 5' 2\" (1.575 m)  Wt 207 lb 9.6 oz (94.2 kg)  SpO2 99%  BMI 37.97 kg/m2  Body mass index is 37.97 kg/(m^2).  GENERAL: healthy, alert and no distress  RESP: lungs clear to auscultation - no rales, rhonchi or wheezes  CV: regular rate and rhythm, normal S1 S2, no S3 or S4, no murmur, click or rub, no peripheral edema and peripheral pulses strong  ABDOMEN: soft, no hepatosplenomegaly, no masses and bowel sounds normal. Tenderness across the lower quadrants. Today no tenderness in upper quadrants or epigastric area   (female): deferred per request due to menses  MS: no gross musculoskeletal defects noted, no edema  SKIN: no suspicious lesions or rashes  PSYCH: mentation appears normal, affect normal/bright    ASSESSMENT/PLAN:   1. Dysmenorrhea  We had a lengthy discussion related to her heavy, long, irregular and painful cycles. We discussed her questions related to endometriosis. Discussed only definitive diagnosis for endometriosis is via surgery. Recommend we start with pelvic ultrasound to evaluate for other etiologies. If normal, we can consider few options:  continuous oral progesterone or Depo Provera, possible use of Depo Lupron, surgical treatment options such as laparoscopy and also discussed hysterectomy as they are not planning further children, consider Nuva Ring-possibly in continuous fashion. She is not sure how she would want to proceed, but likely does want to have implant removed. Will think about the options we discussed while she we wait for the ultrasound and we will follow up at that time. " Ultrasound scheduled for today. Warning signs to monitor for and report immediately discussed with patient and she verbalizes understanding. Patient is given an opportunity to ask questions and have them answered.  - US Pelvic Complete with Transvaginal    2. Menorrhagia with regular cycle  See above  - US Pelvic Complete with Transvaginal    CHRISTOS Jimenez [facility]

## 2018-11-07 NOTE — PROGRESS NOTES
"  SUBJECTIVE:   Kena Tamayo is a 33 year old female who presents to clinic today for the following health issues:      Dysmenorrhea    {additional problems for provider to add:473781}    Problem list and histories reviewed & adjusted, as indicated.  Additional history: {NONE - AS DOCUMENTED:698180::\"as documented\"}    {HIST REVIEW/ LINKS 2:990395}    Reviewed and updated as needed this visit by clinical staff       Reviewed and updated as needed this visit by Provider         {PROVIDER CHARTING PREFERENCE:354519}  "

## 2018-11-07 NOTE — NURSING NOTE
"Chief Complaint   Patient presents with     Hospital F/U       Initial /75  Pulse 77  Temp 97.4  F (36.3  C) (Oral)  Ht 5' 2\" (1.575 m)  Wt 207 lb 9.6 oz (94.2 kg)  SpO2 99%  BMI 37.97 kg/m2 Estimated body mass index is 37.97 kg/(m^2) as calculated from the following:    Height as of this encounter: 5' 2\" (1.575 m).    Weight as of this encounter: 207 lb 9.6 oz (94.2 kg)..  BP completed using cuff size: meghana Anthony CMA    "

## 2018-11-08 ENCOUNTER — TELEPHONE (OUTPATIENT)
Dept: OBGYN | Facility: CLINIC | Age: 33
End: 2018-11-08

## 2018-11-08 RX ORDER — HYDROCODONE BITARTRATE AND ACETAMINOPHEN 5; 325 MG/1; MG/1
1 TABLET ORAL EVERY 6 HOURS PRN
Qty: 12 TABLET | Refills: 0 | Status: SHIPPED | OUTPATIENT
Start: 2018-11-08 | End: 2020-11-03

## 2018-11-08 NOTE — TELEPHONE ENCOUNTER
Telephone call to patient and discussed ultrasound results. Reasonable to send in small quantity of medication, advised not to take additional Tylenol, but can take NSAIDs between doses. Prescription will be at clinic pharmacy.  She and her  would like to be seen for a hysterectomy consult, likely wants consult to be done at [facility]. To call [phone] and ask for [facility] to [facility] consult. Questions answered. Radha SHER CNP

## 2018-11-08 NOTE — TELEPHONE ENCOUNTER
Telephone call to patient and request she call back so we can discuss medication and also her ultrasound results. Radha SHER CNP

## 2018-11-08 NOTE — TELEPHONE ENCOUNTER
Patient calling would like to discuss options given re hysterectomy, wondering if she could still get cysts on her ovaries after this if the ovaries stay. Please call to discuss.

## 2018-11-08 NOTE — TELEPHONE ENCOUNTER
Will route to CHRISTOS Vuong, CNP so she can call patient to discuss US and perhaps below message. Sharonda Ortega RN, BAN

## 2018-11-08 NOTE — TELEPHONE ENCOUNTER
Phone call to patient. Verified question as below. Explained cannot guarantee that she will not get future ovarian cysts. Stated it would be discussed at the surgery consult appt if it is best to remove ovaries at a young age or not. Patient agreed to discuss at the consultation. Sharonda Ortega RN, KAEL

## 2018-11-09 ENCOUNTER — OFFICE VISIT (OUTPATIENT)
Dept: OBGYN | Facility: CLINIC | Age: 33
End: 2018-11-09
Payer: COMMERCIAL

## 2018-11-09 VITALS
SYSTOLIC BLOOD PRESSURE: 119 MMHG | WEIGHT: 208 LBS | BODY MASS INDEX: 38.04 KG/M2 | DIASTOLIC BLOOD PRESSURE: 84 MMHG | HEART RATE: 89 BPM

## 2018-11-09 DIAGNOSIS — N94.6 DYSMENORRHEA: ICD-10-CM

## 2018-11-09 DIAGNOSIS — N92.0 MENORRHAGIA WITH REGULAR CYCLE: Primary | ICD-10-CM

## 2018-11-09 PROCEDURE — 99215 OFFICE O/P EST HI 40 MIN: CPT | Performed by: OBSTETRICS & GYNECOLOGY

## 2018-11-09 NOTE — MR AVS SNAPSHOT
After Visit Summary   11/9/2018    Kena Tamayo    MRN: [id number]           Patient Information     Date Of Birth          1985        Visit Information        Provider Department      11/9/2018 3:15 PM Rosita Ro MD [facility]        Today's Diagnoses     Menorrhagia with regular cycle    -  1    Dysmenorrhea           Follow-ups after your visit        Your next 10 appointments already scheduled     Nov 19, 2018  4:00 PM CST   Office Visit with Dell Wilder MD   [facility] ([facility])    [id number] [place] [id number] [phone]           Bring a current list of meds and any records pertaining to this visit. For Physicals, please bring immunization records and any forms needing to be filled out. Please arrive 10 minutes early to complete paperwork.              Who to contact     If you have questions or need follow up information about today's clinic visit or your schedule please contact [facility] directly at [phone].  Normal or non-critical lab and imaging results will be communicated to you by [address] [employer], letter or phone within 4 business days after the clinic has received the results. If you do not hear from us within 7 days, please contact the clinic through [employer] or phone. If you have a critical or abnormal lab result, we will notify you by phone as soon as possible.  Submit refill requests through [employer] or call your pharmacy and they will forward the refill request to us. Please allow 3 business days for your refill to be completed.          Additional Information About Your Visit        [address] [employer] Information     [employer] gives you secure access to your electronic health record. If you see a primary care provider, you can also send messages to your care team and make appointments. If you have questions, please call your primary care clinic.  If you do not have a primary care  provider, please call [phone] and they will assist you.        Care EveryWhere ID     This is your Care EveryWhere ID. This could be used by other organizations to access your [place] medical records  [id number]        Your Vitals Were     Pulse Last Period BMI (Body Mass Index)             89 11/07/2018 38.04 kg/m2          Blood Pressure from Last 3 Encounters:   11/09/18 119/84   11/07/18 116/75   10/01/18 128/81    Weight from Last 3 Encounters:   11/09/18 208 lb (94.3 kg)   11/07/18 207 lb 9.6 oz (94.2 kg)   10/01/18 203 lb (92.1 kg)              We Performed the Following     [employer] Worksheet        Primary Care Provider Office Phone # Fax #    [facility] [phone]       [address] [place] [id number]        Equal Access to Services     ELENA BELL : Hadii marcus ku hadasho Soomaali, waaxda luqadaha, qaybta kaalmada adeegyada, waxay karenain haypantera figueroa . So [facility] [phone].    ATENCIÓN: Si habla español, tiene a foy disposición servicios gratuitos de asistencia lingüística. Samuel al [phone].    We comply with applicable federal civil rights laws and [place] laws. We do not discriminate on the basis of race, color, national origin, age, disability, sex, sexual orientation, or gender identity.            Thank you!     Thank you for choosing [facility]  for your care. Our goal is always to provide you with excellent care. Hearing back from our patients is one way we can continue to improve our services. Please take a few minutes to complete the written survey that you may receive in the mail after your visit with us. Thank you!             Your Updated Medication List - Protect others around you: Learn how to safely use, store and throw away your medicines at www.disposemymeds.org.          This list is accurate as of 11/9/18  4:29 PM.  Always use your most recent med list.                   Brand Name Dispense  Instructions for use Diagnosis    albuterol 108 (90 Base) MCG/ACT inhaler    PROAIR HFA/PROVENTIL HFA/VENTOLIN HFA    1 Inhaler    Inhale 1-2 puffs into the lungs every 6 hours    Wheezing       ARIPiprazole 5 MG tablet    ABILIFY     Take 5 mg by mouth        CYMBALTA PO      Take 90 mg by mouth daily        etonogestrel 68 MG Impl    IMPLANON/NEXPLANON     1 each by Subdermal route once        HYDROcodone-acetaminophen 5-325 MG per tablet    NORCO    12 tablet    Take 1 tablet by mouth every 6 hours as needed for pain    Cyst of left ovary       TOPAMAX 100 MG tablet   Generic drug:  topiramate      Take 100 mg by mouth 2 times daily

## 2018-11-09 NOTE — PROGRESS NOTES
CC; would like hysterectomy    HPI: Kena Tamayo is a 33 year old  who here is to discuss hysterectomy.  She reports that since she started her periods in her early teens, she has had horrible periods.  They are heavy and painful.  She reports that her periods currently are monthly, about Q28 days, lasting 7-10 days with heavy flow most days.  On her heaviest, she will soak through a super plus tampon within a few hours.  She also has significant cramping during menses.  The pain is significant enough that she cannot sleep and has to take time off work.  She has tried all forms of hormonal contraception without success in improving her periods. This includes OCPs, nuvaring, mirena (expelled x 3 during heavy bleeding episodes), and currently nexplanon.  She had a nexplanon previously and had no periods, but since it was replaced a year ago her periods have been monthly and really bad.  She has 2 children, is done having kids and would like definitive management with hysterectomy.    She was recently seen by a CNP for this and pelvic us was ordered, which was essentially normal, no fibroids seen.    ROS:  C: NEGATIVE for fever, chills, change in weight  I: NEGATIVE for worrisome rashes, moles or lesions  E: NEGATIVE for vision changes or irritation  E/M: NEGATIVE for ear, mouth and throat problems  R: NEGATIVE for significant cough or SOB  CV: NEGATIVE for chest pain, palpitations or peripheral edema  GI: NEGATIVE for nausea, abdominal pain, heartburn, or change in bowel habits  : NEGATIVE for frequency, dysuria, hematuria, vaginal discharge  M: NEGATIVE for significant arthralgias or myalgia  N: NEGATIVE for weakness, dizziness or paresthesias  E: NEGATIVE for temperature intolerance, skin/hair changes  P: NEGATIVE for changes in mood or affect    Past Medical History:   Diagnosis Date     Anxiety      Asthma      Bipolar 1 disorder (H)      Depression      Past Surgical History:   Procedure Laterality  Date     PE TUBES      as a child     wisfom teeth       Obstetric History       T2      L2     SAB0   TAB0   Ectopic0   Multiple0   Live Births2       # Outcome Date GA Lbr Joo/2nd Weight Sex Delivery Anes PTL Lv   2 Term         AMANDA   1 Term         AMANDA           Allergies   Allergen Reactions     Penicillins Rash     Amoxicillin      Ceclor [Cefaclor]        Current Outpatient Prescriptions:      albuterol (PROAIR HFA/PROVENTIL HFA/VENTOLIN HFA) 108 (90 Base) MCG/ACT inhaler, Inhale 1-2 puffs into the lungs every 6 hours, Disp: 1 Inhaler, Rfl: 0     ARIPiprazole (ABILIFY) 5 MG tablet, Take 5 mg by mouth, Disp: , Rfl:      DULoxetine HCl (CYMBALTA PO), Take 90 mg by mouth daily, Disp: , Rfl:      etonogestrel (IMPLANON/NEXPLANON) 68 MG IMPL, 1 each by Subdermal route once, Disp: , Rfl:      HYDROcodone-acetaminophen (NORCO) 5-325 MG per tablet, Take 1 tablet by mouth every 6 hours as needed for pain, Disp: 12 tablet, Rfl: 0     topiramate (TOPAMAX) 100 MG tablet, Take 100 mg by mouth 2 times daily, Disp: , Rfl:   Social History     Social History     Marital status:      Spouse name: N/A     Number of children: N/A     Years of education: N/A     Occupational History     Not on file.     Social History Main Topics     Smoking status: Never Smoker     Smokeless tobacco: Never Used     Alcohol use No     Drug use: No     Sexual activity: Yes     Partners: Male     Birth control/ protection: Implant     Other Topics Concern     Not on file     Social History Narrative     Family History   Problem Relation Age of Onset     Unknown/Adopted Father      Past medical, surgical, social and family history were reviewed and updated in [facility].    PE: /84  Pulse 89  Wt 208 lb (94.3 kg)  LMP 2018  BMI 38.04 kg/m2    Gen: NAD    No further exam done    18:  FINDINGS: Transvaginal images were performed to better evaluate the  patient's uterus, ovaries and endometrial stripe.     The  uterus is prominent in size measuring 10.7 x 5.5 x 4.8 cm. No  fibroids are evident. Endometrial stripe measures 3 mm and is normal  for patient's age. The right ovary is normal measuring 2.5 x 2.0 x 2.2  cm. The left ovary measures 3.5 x 2.9 x 2.8 cm and contains a mildly  complex cystic lesion measuring 2.5 x 1.8 x 2.4 cm. The ovaries  demonstrate normal color Doppler flow bilaterally. No adnexal masses  are present. No free pelvic fluid is present.         IMPRESSION: Mildly complex left ovarian cyst possibly a collapsing  corpus luteal cyst or resolving hemorrhagic cyst. Follow-up ultrasound  in two or three months could be performed to assess for resolution if  clinically warranted. Uterus, endometrial stripe and right ovary are  otherwise unremarkable. No endometrial thickening.     CHARLOTTE SHETTY MD    A/P: Menorrhagia, dysmenorrhea   We had a lengthy discussion about her periods specifically the amount of bleeding she has as well as the pain that she has with her periods.  We discussed that there are various causes and sometimes we do not have an exact reason for her bad periods.  We reviewed her recent ultrasound and explained that there is no obvious fibroids seen which are a common cause of periods like she describes.  I explained the 2 most likely reasons are either adenomyosis which we would not see prior to a pathologic diagnosis or endometriosis.  We discussed in detail what endometriosis is how it comes about the type of symptoms it causes out stimulated by estrogen and typical management options.  I explained that she certainly has utilized most of the first-line medical therapies both for menorrhagia dysmenorrhea and endometriosis without success.  We discussed that in addition to hysterectomy she does have an alternative surgical option that is less invasive which would be an endometrial ablation.  We discussed briefly with the endometrial ablation is and the likelihood of amenorrhea or improve  periods.  At this point she feels that she does not want to risk continuing to have periods and really is looking for something definitive such as a hysterectomy.   We discussed the options for route of hysterectomy including vaginal and laparoscopic.  I explained that given the size of the uterus and her 2 vaginal deliveries vaginal approach would be desirable.  I explained given her history of significant pain I am concerned about an endometriosis picture that could potentially have significant scarring in the pelvis.  I explained the alternative to a vaginal would then be a laparoscopic approach reviewed have direct visualization of the pelvis and the pelvic anatomy so that any scarring would be seen.  Explained that in the long run it may be overkill if there is no scarring but it would help to decrease the chance of inadvertently damaging surrounding organs if there was adhesive disease.  Both she and her  who is with her today and agreed that they felt that was the safer route and preferred to go with a laparoscopic approach.  We discussed general details about the surgery including incision placement and size typical activity restrictions following the surgery and recovery period.  She is a  that just started a new job this year so is not sure that she will be able to get enough time off through short-term disability so will need to check with her HR department about that.  We discussed the option of Lupron trial if she is unable to get the time off before summer break to see if that relieves her symptoms in the interim.  She would be interested in that if she is unable to do the hysterectomy before the summer.  We did briefly discuss the side effects of Lupron and the fact that she could continue using her Nexplanon which would hopefully help with some of the side effects.   We discussed removal of fallopian tubes at the time of hysterectomy and she desires.  We discussed the  pros and cons of ovarian preservation and I explained that unless she has extensive endometriosis, I would recommend leaving the ovaries in.  I explained that if there is extensive endo, they appear abnormal, or a I cannot safely dissect them from the uterus, then I would decide to remove them at the time of surgery.  She is ok either way.   We discussed the need for endometrial sampling prior to surgery so what I will do is have any the surgery scheduler contact her to get the surgery scheduled and then an appointment with me in the next couple weeks for endometrial biopsy.  We briefly discussed the procedure. We discussed the risk of surgery including, but not limited to, risk of anesthesia, bleeding, infection, injury to abdominal or pelvic organs, and risk of injury requiring second surgery.  We discussed that with a laparoscopic route if I am unable to safely remove the uterus laparoscopically I get into bleeding I cannot control through the laparoscope or eye damage to surrounding organ I will have to make a larger abdominal incision and enter the abdomen that we can complete the surgery.  I explained that if that were the case she would need to stay at least one night in the hospital.  She gave verbal consent and all questions were answered.    ANGELA HOWELL MD      This visit lasted approximately 40 minutes and >50% of the time was spent on counseling about periods and mgmt.

## 2018-11-12 ENCOUNTER — HOSPITAL ENCOUNTER (OUTPATIENT)
Facility: CLINIC | Age: 33
End: 2018-11-12
Attending: OBSTETRICS & GYNECOLOGY | Admitting: OBSTETRICS & GYNECOLOGY
Payer: COMMERCIAL

## 2018-11-12 ENCOUNTER — TELEPHONE (OUTPATIENT)
Dept: OBGYN | Facility: CLINIC | Age: 33
End: 2018-11-12

## 2018-11-12 NOTE — TELEPHONE ENCOUNTER
Surgery updated to:    Type of surgery: gyn  Location of [facility] [facility] [place]  Date and time of surgery: 12/21/18 730a  Surgeon: Jayjay  Pre-Op Appt Date: tbd  Post-Op Appt Date: tbd   Packet sent out: Yes  Pre-cert/Authorization completed:  No  Date: 11/12/2018    Connie LARES, Surgery Coordinator

## 2018-11-12 NOTE — TELEPHONE ENCOUNTER
Type of surgery: gyn  Location of surgery: [facility]  Date and time of surgery: 1/2/19 730a  Surgeon: Jayjay  Pre-Op Appt Date: tbd  Post-Op Appt Date: tbd   Packet sent out: Yes  Pre-cert/Authorization completed:  No  Date: 11/12/2018    Connie LARES, Surgery Coordinator

## 2018-11-21 RX ORDER — PHENAZOPYRIDINE HYDROCHLORIDE 200 MG/1
200 TABLET, FILM COATED ORAL ONCE
Status: CANCELLED | OUTPATIENT
Start: 2018-11-21 | End: 2018-11-21

## 2018-11-21 RX ORDER — CLINDAMYCIN PHOSPHATE 900 MG/50ML
900 INJECTION, SOLUTION INTRAVENOUS SEE ADMIN INSTRUCTIONS
Status: CANCELLED | OUTPATIENT
Start: 2018-11-21

## 2018-11-21 RX ORDER — CLINDAMYCIN PHOSPHATE 900 MG/50ML
900 INJECTION, SOLUTION INTRAVENOUS
Status: CANCELLED | OUTPATIENT
Start: 2018-11-21

## 2018-12-14 ENCOUNTER — TELEPHONE (OUTPATIENT)
Dept: OBGYN | Facility: CLINIC | Age: 33
End: 2018-12-14

## 2018-12-14 DIAGNOSIS — N92.0 MENORRHAGIA: ICD-10-CM

## 2018-12-14 DIAGNOSIS — Z01.818 PRE-OP EXAM: Primary | ICD-10-CM

## 2018-12-14 NOTE — TELEPHONE ENCOUNTER
LMTC.  Pt needs to schedule appointment for Monday otherwise she will need to have her surgery cancelled and rescheduled for a later date.  Katty Heard RN

## 2018-12-19 NOTE — TELEPHONE ENCOUNTER
Zuleika, called from pre-admissions, wondering if surgery is still on for Friday since pt never called back and was not seen on Monday per IRAM note. Looks like pt coming in a 5pm today for labs. Please advise. Thanks.   Stephanie Friend, RN-BSN

## 2018-12-19 NOTE — TELEPHONE ENCOUNTER
Paged IRAM. Per IRAM, pt needs EMB prior to surgery so surgery should be canceled. A message has been left with pt to call the clinic.     TC to Zuleika. Left message with Allie that surgery is canceled.   Stephanie Friend RN-BSN

## 2019-02-20 NOTE — TELEPHONE ENCOUNTER
----- Message from Angela Ro MD sent at 12/14/2018  4:24 PM CST -----  Kena canceled her visit last week due to heavy bleeding, but she has not rescheduled.  She needs an EMB prior to surgery, which is scheduled next Friday, 12/21.  I am off Monday, could see her on call Tuesday if the day allows, but I am concerned we won't get her results back in time.  If Monday skylar (Tatum) is willing to do it that would help, maybe? Otherwise we will have to reschedule hysterectomy.  Thanks    ANGELA RO MD     SICU Daily Progress Note  =====================================================  Interval/Overnight Events: No acute events overnight. Patient remained hemodynamically stable.     POD # 3         	SICU Day #   4    HPI:  67 year old female with a history of COPD, depression, left breast cancer, large left renal mass, s/p  left lap radical nephrectomy/adrenalectomy on 2/13, discharged yesterday, had postop fever 101.8F, pre-discharge Hgb 7.7, developed weakness, dizziness, found to be in shock, Hgb 6.6, CT showed large perisplenic hematoma [id number] cm. She was transfused 2 units pRBC at [facility] ED and transferred to [facility] for further management. SICU consulted for hemodynamic monitoring. Patient seen and exmained in ED. Patient awake and alert, urology at bedside in ED. Patient hypotensive, receiving an additional 2 prbc. IR consulted for possible intervention.     Allergies: Cipro (Unknown)      MEDICATIONS:   --------------------------------------------------------------------------------------  Neurologic Medications  acetaminophen   Tablet .. 650 milliGRAM(s) Oral every 6 hours  escitalopram 20 milliGRAM(s) Oral daily  oxyCODONE    IR 5 milliGRAM(s) Oral every 4 hours PRN Moderate Pain (4 - 6)  oxyCODONE    IR 10 milliGRAM(s) Oral every 4 hours PRN Severe Pain (7 - 10)    Respiratory Medications  ALBUTerol    90 MICROgram(s) HFA Inhaler 1 Puff(s) Inhalation two times a day  tiotropium 18 MICROgram(s) Capsule 1 Capsule(s) Inhalation daily    Cardiovascular Medications  metoprolol tartrate 25 milliGRAM(s) Oral once  metoprolol tartrate Injectable 5 milliGRAM(s) IV Push every 6 hours    Gastrointestinal Medications  dextrose 5% + sodium chloride 0.45%. 1000 milliLiter(s) IV Continuous <Continuous>  pantoprazole    Tablet 40 milliGRAM(s) Oral before breakfast    Genitourinary Medications    Hematologic/Oncologic Medications  heparin  Injectable 5000 Unit(s) SubCutaneous every 8 hours    Antimicrobial/Immunologic Medications    Endocrine/Metabolic Medications    Topical/Other Medications  chlorhexidine 4% Liquid 1 Application(s) Topical <User Schedule>    --------------------------------------------------------------------------------------    VITAL SIGNS, INS/OUTS (last 24 hours):  --------------------------------------------------------------------------------------  ((Insert SICU Vitals/Is+Os here))***  --------------------------------------------------------------------------------------    EXAM  NEUROLOGY  RASS: 0 GCS: 15  Exam: Normal, NAD, alert, oriented x3, no focal deficits.    HEENT  Exam: Normocephalic, atraumatic, EOMI.      RESPIRATORY  Exam: Lungs clear to auscultation, Normal expansion/effort.     CARDIOVASCULAR  Exam: S1, S2.  Regular rate and rhythm.      GI/NUTRITION  Exam: Abdomen soft, distended, appropriately tender   Current Diet:  CLD    VASCULAR  Exam: Extremities warm, pink, well-perfused.     MUSCULOSKELETAL  Exam: All extremities moving spontaneously without limitations.    SKIN  Exam: Good skin turgor, no skin breakdown.    METABOLIC/FLUIDS/ELECTROLYTES  dextrose 5% + sodium chloride 0.45%. 1000 milliLiter(s) IV Continuous <Continuous>      HEMATOLOGIC  [x] VTE Prophylaxis: heparin  Injectable 5000 Unit(s) SubCutaneous every 8 hours    Transfusions:	[] PRBC	[] Platelets		[] FFP	[] Cryoprecipitate    INFECTIOUS DISEASE  Antimicrobials/Immunologic Medications:    Day #      of     ***    Tubes/Lines/Drains  ***  [x] Peripheral IV  [] Central Venous Line     	[] R	[] L	[] IJ	[] Fem	[] SC	Date Placed:   [] Arterial Line		[] R	[] L	[] Fem	[] Rad	[] Ax	Date Placed:   [] PICC		[] Midline		[] Mediport  [] Urinary Catheter		Date Placed:   [x] Necessity of urinary, arterial, and venous catheters discussed    LABS  --------------------------------------------------------------------------------------  ((Insert SICU Labs here))***  --------------------------------------------------------------------------------------    OTHER LABORATORY:     IMAGING STUDIES:   CXR:

## 2019-06-04 PROBLEM — M72.2 PLANTAR FASCIITIS: Status: RESOLVED | Noted: 2018-07-05 | Resolved: 2019-06-04

## 2019-06-04 NOTE — PROGRESS NOTES
Patient seen for one time evaluation and treatment.  Patient did not return for further treatment and current status is unknown.  Please see initial evaluation for further information.    Jim Sumner,PT, DPT, OCS

## 2019-11-06 ENCOUNTER — HEALTH MAINTENANCE LETTER (OUTPATIENT)
Age: 34
End: 2019-11-06

## 2020-02-16 ENCOUNTER — HEALTH MAINTENANCE LETTER (OUTPATIENT)
Age: 35
End: 2020-02-16

## 2020-11-03 ENCOUNTER — OFFICE VISIT (OUTPATIENT)
Dept: FAMILY MEDICINE | Facility: OTHER | Age: 35
End: 2020-11-03
Payer: COMMERCIAL

## 2020-11-03 VITALS
SYSTOLIC BLOOD PRESSURE: 120 MMHG | HEART RATE: 83 BPM | TEMPERATURE: 97.9 F | RESPIRATION RATE: 16 BRPM | DIASTOLIC BLOOD PRESSURE: 84 MMHG | OXYGEN SATURATION: 99 % | WEIGHT: 250 LBS | BODY MASS INDEX: 45.73 KG/M2

## 2020-11-03 DIAGNOSIS — E66.01 MORBID OBESITY (H): ICD-10-CM

## 2020-11-03 DIAGNOSIS — H60.8X2 CHRONIC REACTIVE OTITIS EXTERNA OF LEFT EAR: Primary | ICD-10-CM

## 2020-11-03 PROCEDURE — 99213 OFFICE O/P EST LOW 20 MIN: CPT | Performed by: FAMILY MEDICINE

## 2020-11-03 RX ORDER — LAMOTRIGINE 200 MG/1
100 TABLET ORAL
COMMUNITY
Start: 2020-05-12 | End: 2023-12-14

## 2020-11-03 RX ORDER — HYDROCORTISONE AND ACETIC ACID 20.75; 10.375 MG/ML; MG/ML
3 SOLUTION AURICULAR (OTIC) 2 TIMES DAILY
Qty: 3 ML | Refills: 0 | Status: SHIPPED | OUTPATIENT
Start: 2020-11-03 | End: 2020-11-10

## 2020-11-03 ASSESSMENT — PAIN SCALES - GENERAL: PAINLEVEL: NO PAIN (0)

## 2020-11-03 NOTE — PATIENT INSTRUCTIONS
Patient Education     External Ear Infection (Adult)    External otitis (also called  swimmer s ear ) is an infection in the ear canal. It is often caused by bacteria or fungus. It can occur a few days after water gets trapped in the ear canal (from swimming or bathing). It can also occur after cleaning too deeply in the ear canal with a cotton swab or other object. Sometimes, hair care products get into the ear canal and cause this problem.  Symptoms can include pain, fever, itching, redness, drainage, or swelling of the ear canal. Temporary hearing loss may also occur.  Home care    Do not try to clean the ear canal. This can push pus and bacteria deeper into the canal.    Use prescribed ear drops as directed. These help reduce swelling and fight the infection. If an ear wick was placed in the ear canal, apply drops right onto the end of the wick. The wick will draw the medicine into the ear canal even if it is swollen closed.    A cotton ball may be loosely placed in the outer ear to absorb any drainage.    You may use acetaminophen or ibuprofen to control pain, unless another medicine was prescribed. Note: If you have chronic liver or kidney disease or ever had a stomach ulcer or GI bleeding, talk to your healthcare provider before taking any of these medicines.    Do not allow water to get into your ear when bathing. Also, don't swim until the infection has cleared.  Prevention    Keep your ears dry. This helps lower the risk of infection. Dry your ears with a towel or hair dryer after getting wet. Also, use ear plugs when swimming.    Do not stick any objects in the ear to remove wax.    If you feel water trapped in your ear, use ear drops right away. You can get these drops over the counter at most drugstores. They work by removing water from the ear canal.  Follow-up care  Follow up with your healthcare provider in 1 week, or as advised.  When to seek medical advice  Call your healthcare provider right  away if any of these occur:    Ear pain becomes worse or doesn t improve after 3 days of treatment    Redness or swelling of the outer ear occurs or gets worse    Headache    Painful or stiff neck    Drowsiness or confusion    Fever of 100.4 F (38 C) or higher, or as directed by your healthcare provider    Felice Bansal last reviewed this educational content on 10/1/2017    6574-7250 [employer], [employer]. [address] [place] [address]. All rights reserved. This information is not intended as a substitute for professional medical care. Always follow your healthcare professional's instructions.

## 2020-11-03 NOTE — PROGRESS NOTES
Subjective     Kena Tamayo is a 35 year old female who presents to clinic today for the following health issues:    HPI         Acute Illness  Acute illness concerns:  Right ear pain   Onset/Duration:   Several months  Symptoms:  Fever: no  Chills/Sweats: no  Headache (location?): no  Sinus Pressure: no  Conjunctivitis:  no  Ear Pain: YES-   Right ear  Rhinorrhea: no  Congestion: no  Sore Throat: no  Cough:    A tickle  Wheeze: no  Decreased Appetite: no  Nausea: no  Vomiting: no  Diarrhea: no  Dysuria/Freq.: no  Dysuria or Hematuria: no  Fatigue/Achiness: no  Sick/Strep Exposure: no  Therapies tried and outcome:   None      Review of Systems   Constitutional, HEENT, cardiovascular, pulmonary, GI, , musculoskeletal, neuro, skin, endocrine and psych systems are negative, except as otherwise noted.      Objective    /84   Pulse 83   Temp 97.9  F (36.6  C)   Resp 16   Wt 113.4 kg (250 lb)   SpO2 99%   BMI 45.73 kg/m    Body mass index is 45.73 kg/m .  Physical Exam  Constitutional:       Appearance: Normal appearance.   HENT:      Head: Normocephalic and atraumatic.      Comments: Creamy white discharge noted along the distal external canal on right  Consistent with otitis externa.     Right Ear: Tympanic membrane normal.      Left Ear: Tympanic membrane normal.   Neck:      Musculoskeletal: Normal range of motion and neck supple.   Cardiovascular:      Rate and Rhythm: Normal rate and regular rhythm.      Pulses: Normal pulses.   Pulmonary:      Effort: Pulmonary effort is normal.      Breath sounds: Normal breath sounds.   Neurological:      Mental Status: She is alert.   Psychiatric:         Mood and Affect: Mood normal.         Behavior: Behavior normal.        1. Chronic reactive otitis externa of left ear  - no purulence or edema noted to suspect bacterial infection  -trial acetic acid and hydrocortisone to help with itching and reactive otitis externa.  - acetic acid-hydrocortisone (VOSOL-HC)  1-2 % otic solution; Place 3 drops into the right ear 2 times daily for 7 days  Dispense: 3 mL; Refill: 0    2. Morbid obesity (H)  Encouraged weight loss

## 2020-11-05 ENCOUNTER — TELEPHONE (OUTPATIENT)
Dept: FAMILY MEDICINE | Facility: OTHER | Age: 35
End: 2020-11-05

## 2020-11-05 DIAGNOSIS — H60.62 CHRONIC OTITIS EXTERNA OF LEFT EAR, UNSPECIFIED TYPE: Primary | ICD-10-CM

## 2020-11-05 NOTE — TELEPHONE ENCOUNTER
Spoke to pharmacist to see if he could tell us an alternative one would be. And he said he really doesn't know  He talked about the plain acetic acid one w/o the hyrdrocortisone - bu that wasn't a for sure thing either.

## 2020-11-05 NOTE — TELEPHONE ENCOUNTER
[facility] Request:    acetic acid-hydrocortisone (VOSOL-HC) 1-2 % otic solution    This medication is on backorder.  Please consider an alternative therapy. Thanks

## 2020-11-16 ENCOUNTER — VIRTUAL VISIT (OUTPATIENT)
Dept: FAMILY MEDICINE | Facility: OTHER | Age: 35
End: 2020-11-16
Payer: COMMERCIAL

## 2020-11-16 ENCOUNTER — TELEPHONE (OUTPATIENT)
Dept: SURGERY | Facility: CLINIC | Age: 35
End: 2020-11-16

## 2020-11-16 DIAGNOSIS — E66.01 MORBID OBESITY (H): Primary | ICD-10-CM

## 2020-11-16 DIAGNOSIS — F41.1 GENERALIZED ANXIETY DISORDER: ICD-10-CM

## 2020-11-16 DIAGNOSIS — F32.89 ATYPICAL DEPRESSIVE DISORDER: ICD-10-CM

## 2020-11-16 PROBLEM — F41.9 ANXIETY DISORDER: Status: ACTIVE | Noted: 2017-03-17

## 2020-11-16 PROCEDURE — 99214 OFFICE O/P EST MOD 30 MIN: CPT | Mod: 95 | Performed by: FAMILY MEDICINE

## 2020-11-16 NOTE — PROGRESS NOTES
"Kena Tamayo is a 35 year old female who is being evaluated via a billable video visit.      The patient has been notified of following:     \"This video visit will be conducted via a call between you and your physician/provider. We have found that certain health care needs can be provided without the need for an in-person physical exam.  This service lets us provide the care you need with a video conversation.  If a prescription is necessary we can send it directly to your pharmacy.  If lab work is needed we can place an order for that and you can then stop by our lab to have the test done at a later time.    Video visits are billed at different rates depending on your insurance coverage.  Please reach out to your insurance provider with any questions.    If during the course of the call the physician/provider feels a video visit is not appropriate, you will not be charged for this service.\"    Patient has given verbal consent for Video visit? Yes  How would you like to obtain your AVS? MyChart  If you are dropped from the video visit, the video invite should be resent to: Text to cell phone: [phone]  Will anyone else be joining your video visit? No      Subjective     Kena Tamayo is a 35 year old female who presents today via video visit for the following health issues:    HPI     Patient is concerned that she is lactose intolerant.     Patient is wondering about some weight loss options. She has done some research about the gastric sleeve. She has been trying to lose weight- and is not losing weight. She is trying to eat better and be more active.        Trying to cut out dairy, but every time she eats dairy she has stomach cramping and sweating and runny stools.        Video Start Time: 11:24 AM    Has tried walking and exercising and has had limited success and is limited due to COVID. Came in a few weeks ago and was morbidly obese. Very upset about this. Hates herself with her current weight and " has tried exercising         Review of Systems   Constitutional, HEENT, cardiovascular, pulmonary, GI, , musculoskeletal, neuro, skin, endocrine and psych systems are negative, except as otherwise noted.      Objective           Vitals:  No vitals were obtained today due to virtual visit.    Physical Exam     GENERAL: Healthy, alert and no distress  EYES: Eyes grossly normal to inspection.  No discharge or erythema, or obvious scleral/conjunctival abnormalities.  RESP: No audible wheeze, cough, or visible cyanosis.  No visible retractions or increased work of breathing.    SKIN: Visible skin clear. No significant rash, abnormal pigmentation or lesions.  NEURO: Cranial nerves grossly intact.  Mentation and speech appropriate for age.  PSYCH: Mentation appears normal, affect normal/bright, judgement and insight intact, normal speech and appearance well-groomed.              Assessment & Plan       ICD-10-CM    1. Morbid obesity (H)  E66.01 COMPREHENSIVE WEIGHT MANAGEMENT   2. Generalized anxiety disorder  F41.1    3. Atypical depressive disorder  F32.89             Patient would like to get set up with bariatric surgery as she feels she is already eating well and has had periods of doing well with exercise in the past and has not found success. Aware her psychiatric meds may contribute, but she has both ones that may contribute to weight gain as well as weight loss.  Feels mood is doing well, but is frustrated and saddened by her weight. Follows with psychiatry and they are aware of her weight frustrations.  Is planning follow-up with bariatric surgery to start the process and asks if she can complete it before the end of the year. Informed her that this is unlikely given the current environment. But certainly worthwhile to look into it.    Return in about 3 months (around 2/16/2021) for if not improved.    Safia Ha MD, MD  [facility]      Video-Visit Details    Type of service:  Video  Visit    Video End Time:11:44 AM    Originating Location (pt. Location): Home    Distant Location (provider location):  [facility]     Platform used for Video Visit: Joy

## 2020-11-16 NOTE — PATIENT INSTRUCTIONS
Lactaid to take with dairy when you eat it and start with small amounts.  Before restarting dairy, you should get on a regular routine of taking lactobacillus (probiotics like culturelle).    [facility] - [place] [phone] [email or web address]

## 2020-11-17 ENCOUNTER — MYC MEDICAL ADVICE (OUTPATIENT)
Dept: SURGERY | Facility: CLINIC | Age: 35
End: 2020-11-17

## 2020-11-17 ENCOUNTER — VIRTUAL VISIT (OUTPATIENT)
Dept: SURGERY | Facility: CLINIC | Age: 35
End: 2020-11-17
Payer: COMMERCIAL

## 2020-11-17 VITALS
SYSTOLIC BLOOD PRESSURE: 124 MMHG | WEIGHT: 251 LBS | HEIGHT: 62 IN | DIASTOLIC BLOOD PRESSURE: 84 MMHG | BODY MASS INDEX: 46.19 KG/M2

## 2020-11-17 DIAGNOSIS — Z13.21 SCREENING FOR ENDOCRINE, NUTRITIONAL, METABOLIC AND IMMUNITY DISORDER: ICD-10-CM

## 2020-11-17 DIAGNOSIS — E66.01 MORBID OBESITY (H): ICD-10-CM

## 2020-11-17 DIAGNOSIS — F31.9 BIPOLAR 1 DISORDER (H): ICD-10-CM

## 2020-11-17 DIAGNOSIS — Z13.228 SCREENING FOR ENDOCRINE, NUTRITIONAL, METABOLIC AND IMMUNITY DISORDER: ICD-10-CM

## 2020-11-17 DIAGNOSIS — Z13.0 SCREENING FOR ENDOCRINE, NUTRITIONAL, METABOLIC AND IMMUNITY DISORDER: ICD-10-CM

## 2020-11-17 DIAGNOSIS — Z13.29 SCREENING FOR ENDOCRINE, NUTRITIONAL, METABOLIC AND IMMUNITY DISORDER: ICD-10-CM

## 2020-11-17 DIAGNOSIS — E66.01 MORBID OBESITY WITH BMI OF 45.0-49.9, ADULT (H): Primary | ICD-10-CM

## 2020-11-17 DIAGNOSIS — Z13.0 SCREENING FOR IRON DEFICIENCY ANEMIA: ICD-10-CM

## 2020-11-17 PROCEDURE — 99214 OFFICE O/P EST MOD 30 MIN: CPT | Mod: 95 | Performed by: PHYSICIAN ASSISTANT

## 2020-11-17 PROCEDURE — 97802 MEDICAL NUTRITION INDIV IN: CPT | Mod: 95 | Performed by: DIETITIAN, REGISTERED

## 2020-11-17 ASSESSMENT — MIFFLIN-ST. JEOR
SCORE: 1796.78
SCORE: 1796.78

## 2020-11-17 NOTE — PATIENT INSTRUCTIONS
It was great meeting you today!  Here is your task list as well as list of psychologists.  Look forward to meeting you in person next month.  Please call [phone] to schedule that surgery.        Bariatric Task List    Lose 5 lbs prior to surgery.  Goal Weight: 246 lbs  Have preoperative laboratory tests drawn.   Psychological Evaluation with MMPI and clearance for weight loss surgery.  Psychiatrist letter of support   Achieve clearance from dietitian to see surgeon.  A total of 6 structured dietitian weight loss visits are required by your insurance.  Bariatric Support Group  Replace [facility]  Psychologist List  Bariatric Psychological Assessments      When you call the psychologist's office,   Please specify you need a  screening psychological assessment for bariatric surgery.    This includes: a bariatric surgery evaluation and MMPI.  The report should be mailed /faxed to our office at [phone].      Psychologists are usually booked several weeks in advance; take this into consideration when you call them.   Please check with your insurance to see that the provider is covered.      [place] Providers:  [facility] (Sites located throughout [facility])  [place], [place], [place], [place] and [place] [phone]    Outside Providers:  [facility]--  Jenny Castaneda MA, [facility]   [address], [address]   [place] [address] [phone]    [facility]--  Betty Izquierdo MS, [facility], [facility]   [address], [address]   [place] [address] [phone]    [facility]--  Meera Kearns PsyD, LP   [address]   [place] [address]    [phone]    [facility]  Leo Jeffrey, PhD, LP   [email or web address]   [address], [address]   [place], [place] [address] [phone]    Marina Escobar PsyD, LP  ([facility] not in network)   [address] [place] [address], [address]   [place]  [place] [address]   [phone]    Haider Gore, PhD, LP   [facility]  [address] [place] [address]   [phone]    [facility]--  Cherie Webster, PhD, LP   [address] [facility] [address]   [place] [address]   [phone]    [facility]    [address] [place] [address] [id number] [phone]    [facility]:  (see approved locations/providers)  *Provider MUST be licensed psychologist (LP).  Byron-Aneta Ferrell PsyD/LP   [place]-Fior Woodward PsyD/LP, Mandy Luo PsyD/LP   Bam-Zeferino Sarmiento, PsyD/LP  Aster Meyer-Alexandrea Rodriguez, PsyD/LP  Judy Andrews-Connie Noriega PsyD/LP, Izabella Zuluaga PsyD/LP  Luann-Narda Holt PhD/LP, Tati Chairez PhD/LP  [place]-Brian Hussein, PsyD/LP  Tonia Velasquez PsyD/LP  [facility]   [phone]

## 2020-11-17 NOTE — PROGRESS NOTES
"Kena Tamayo is a 35 year old female who is being evaluated via a billable video visit.      The patient has been notified of following:     \"This video visit will be conducted via a call between you and your physician/provider. We have found that certain health care needs can be provided without the need for an in-person physical exam.  This service lets us provide the care you need with a video conversation.  If a prescription is necessary we can send it directly to your pharmacy.  If lab work is needed we can place an order for that and you can then stop by our lab to have the test done at a later time.    Video visits are billed at different rates depending on your insurance coverage.  Please reach out to your insurance provider with any questions.    If during the course of the call the physician/provider feels a video visit is not appropriate, you will not be charged for this service.\"    Patient has given verbal consent for Video visit? Yes  How would you like to obtain your AVS? MyChart  If you are dropped from the video visit, the video invite should be resent to: Text to cell phone: [phone]  Will anyone else be joining your video visit? No        Video-Visit Details    Type of service:  Video Visit    Video Start Time: 11:01am  Video End Time: 11:55am    Originating Location (pt. Location): Home    Distant Location (provider location): Provider Remote Setting    Platform used for Video Visit: [facility]    New Bariatric Nutrition Consultation Note    Reason For Visit: Nutrition Assessment    Kena Tamayo is a 35 year old presenting today for new bariatric nutrition consult.  Pt is interested in laparoscopic sleeve gastrectomy.  Patient is accompanied by self.    Support System Reviewed With Patient 11/16/2020   Who do you have in your support network that can be available to help you for the first 2 weeks after surgery?    Who can you count on for support throughout your weight loss " "surgery journey? , kids, and mother       ANTHROPOMETRICS:   Estimated body mass index is 45.91 kg/m  as calculated from the following:    Height as of this encounter: 1.575 m (5' 2\").    Weight as of this encounter: 113.9 kg (251 lb).    Required weight loss goal pre-op: 5 lbs from initial consult weight (goal weight 246 lbs or less before surgery)       11/16/2020   I have tried the following methods to lose weight Watching portions or calories, Exercise, Prescription Medications, Physician directed program       Weight Loss Questions Reviewed With Patient 11/16/2020   How long have you been overweight? Since late 20's to early 40's           NUTRITION HISTORY:  Recall Diet Questions Reviewed With Patient 11/16/2020   Describe what you typically consume for breakfast (typical or most recent): Eggs, cereal   Describe what you typically consume for lunch (typical or most recent): fruit and a meal   Describe what you typically consume for supper (typical or most recent): meal and fruit   Describe what you typically consume as snacks (typical or most recent): Fruit, Cheese, snacky foods   How many ounces of water, or other low calorie drinks, do you drink daily (8 oz=1 glass)? 32 oz   Please indicate the type of milk: 1%   How often do you drink alcohol? Never       Eating Habits 11/16/2020   Do you have any dietary restrictions? Yes   Do you currently binge eat (eat a large amount of food in a short time)? No   Are you an emotional eater? No   Do you get up to eat after falling asleep? No   What foods do you crave? Snacks and carbs       ADDITIONAL INFORMATION:  Pt has been considering surgery for awhile, but more seriously pursuing after a visit with her PCP this week where she was encouraged to move forward. Pt is a vegetarian and lactose intolerant.     Dining Out History Reviewed With Patient 11/16/2020   How often do you dine out? Rarely.   Where do you dine out? (select all that apply) sit-down " restaurants   What types of food do you order when you dine out? pizza, pasta, sandwiches       Physical Activity Reviewed With Patient 11/16/2020   How often do you exercise? 3 to 4 times per week   What is the duration of your exercise (in minutes)? 30 Minutes   What types of exercise do you do? walking, home gym, climbing stairs at work   What keeps you from being more active?  I am as active as I can possbily be       NUTRITION DIAGNOSIS:  Obesity r/t long history of self-monitoring deficit and excessive energy intake aeb BMI >30 kg/m2.    INTERVENTION:  Intervention Provided/Education Provided on post-op diet guidelines, vitamins/minerals essential post-operatively, GI anatomy of bariatric surgeries, ways to help prepare for post-op diet guidelines pre-operatively, portion/calorie-control, mindful eating.  Provided pt with list of goals RD contact information.      Questions Reviewed With Patient 11/16/2020   How ready are you to make changes regarding your weight? Number 1 = Not ready at all to make changes up to 10 = very ready. 10   How confident are you that you can change? 1 = Not confident that you will be successful making changes up to 10 = very confident. 10       Patient Understanding: good  Expected Compliance: good    GOALS:  Begin taking multivitamin, calcium and vitamin D per guidelines  Eat meals off smaller plate  Increase exercise to 4-5x/week    Follow-Up:   Recommend 5 follow up visits to assist with lifestyle changes or per insurance.      Time spent with patient: 54 minutes.      Myranda Mata RD, LD  Clinical Dietitian       JESICA QUINTANILLA      Physical Exam

## 2020-11-17 NOTE — LETTER
Kena Tamayo  November 17, 2020        Bariatric Task List        Lose 5 lbs prior to surgery.  Goal Weight: 246 lbs  Have preoperative laboratory tests drawn.     Psychological Evaluation with MMPI and clearance for weight loss surgery.    Psychiatrist letter of support     Achieve clearance from dietitian to see surgeon.    A total of 6 structured dietitian weight loss visits are required by your insurance.    Bariatric Support Group    Replace tooth

## 2020-11-17 NOTE — PROGRESS NOTES
"Kena Tamayo is a 35 year old female who is being evaluated via a billable video visit.      The patient has been notified of following:     \"This video visit will be conducted via a call between you and your physician/provider. We have found that certain health care needs can be provided without the need for an in-person physical exam.  This service lets us provide the care you need with a video conversation.  If a prescription is necessary we can send it directly to your pharmacy.  If lab work is needed we can place an order for that and you can then stop by our lab to have the test done at a later time.    Video visits are billed at different rates depending on your insurance coverage.  Please reach out to your insurance provider with any questions.    If during the course of the call the physician/provider feels a video visit is not appropriate, you will not be charged for this service.\"    Patient has given verbal consent for Video visit? Yes  How would you like to obtain your AVS? MyChart  If you are dropped from the video visit, the video invite should be resent to: Text to cell phone: [phone]  Will anyone else be joining your video visit? No        Video-Visit Details    Type of service:  Video Visit    Video Start Time: 10:20  Video End Time: 10:58    Originating Location (pt. Location): Home    Distant Location (provider location):  [facility] [place]     Platform used for Video Visit: [facility] Virtual Bariatric Surgery Consultation Note        2020      RE: Kena Tamayo  MR#: [id number]  : 1985      Referring provider:       2020   Who referred you? Safia Ha MD       Chief Complaint/Reason for visit: evaluation for possible weight loss surgery    Dear Clinic, [facility]    I had the pleasure of seeing your patient, Kena Tamayo, virtually to evaluate her obesity and " "consider her for possible weight loss surgery. As you know, Kena Tamayo is 35 year old.  She has a height of 5' 2\"[pt reported[, a weight of 251 lbs 0 oz, and calculated Body mass index is 45.91 kg/m .      HISTORY OF PRESENT ILLNESS:  Weight Loss History Reviewed with Patient 11/16/2020   How long have you been overweight? Since late 20's to early 40's   What is the most that you have ever weighed? 251   What is the most weight you have lost? 0   I have tried the following methods to lose weight Watching portions or calories, Exercise, Prescription Medications, Physician directed program   I have tried the following weight loss medications? (Check all that apply) Topamax/Topiramate   Have you ever had weight loss surgery? No     Patient is on several psych meds  For bipolar that could potentially impact her appetite.  She is also on topiramate to help with appetite.        CO-MORBIDITIES OF OBESITY INCLUDE:     11/16/2020   I have the following health issues associated with obesity: High Blood Pressure       PAST MEDICAL HISTORY:  Past Medical History:   Diagnosis Date     Anxiety      Asthma      Bipolar 1 disorder (H)      Depression        PAST SURGICAL HISTORY:  Past Surgical History:   Procedure Laterality Date     HYSTERECTOMY, PAP NO LONGER INDICATED       PE TUBES      as a child     wisfom teeth       No personal or family history of blood clots or anesthesia concerns      FAMILY HISTORY:   Family History   Problem Relation Age of Onset     Unknown/Adopted Father        SOCIAL HISTORY:   Social History Questions Reviewed With Patient 11/16/2020   Which best describes your employment status (select all that apply) I work full-time   If you work, what is your occupation?    Which best describes your marital status:    Do you have children? Yes   Who do you have in your support network that can be available to help you for the first 2 weeks after surgery?    Who " can you count on for support throughout your weight loss surgery journey? , kids, and mother   Can you afford 3 meals a day?  Yes   Can you afford 50-60 dollars a month for vitamins? Yes       HABITS:     11/16/2020   How often do you drink alcohol? Never   Have you ever used any of the following nicotine products? No   Have you or are you currently using street drugs or prescription strength medication for which you do not have a prescription for? No   Do you have a history of chemical dependency (alcohol or drug abuse)? No       PSYCHOLOGICAL HISTORY:   Psychological History Reviewed With Patient 11/16/2020   Have you ever attempted suicide? Never.   Have you had thoughts of suicide in the past year? No   Have you ever been hospitalized for mental illness or a suicide attempt? Never.   Do you have a history of chronic pain? No   Have you ever been diagnosed with fibromyalgia? No   Are you currently being treated for any of the following? (select all that apply) Depression, Anxiety, Bipolar, I take medication and see a psychiatrist   Are you currently seeing a therapist or counselor?  No   Are you currently seeing a psychiatrist? Yes       ROS:     11/16/2020   Skin:  None of the above   HEENT: Missing teeth   If you answered yes to missing teeth, please indicate how many: 2   Musculoskeletal: None of the above   Cardiovascular: Shortness of breath with activity   Pulmonary: Shortness of breath with activity, Snoring   Gastrointestinal: None of the above   Genitourinary: None of the above   Hematological: None of the above   Neurological: None of the above   Female only: Loss of menstrual cycles       EATING BEHAVIORS:     11/16/2020   Have you or anyone else thought that you had an eating disorder? No   Do you currently binge eat (eat a large amount of food in a short time)? No   Are you an emotional eater? No   Do you get up to eat after falling asleep? No       EXERCISE:     11/16/2020   How often do you  "exercise? 3 to 4 times per week   What is the duration of your exercise (in minutes)? 30 Minutes   What types of exercise do you do? walking, home gym, climbing stairs at work   What keeps you from being more active?  I am as active as I can possbily be       MEDICATIONS:  Current Outpatient Medications   Medication Sig Dispense Refill     albuterol (PROAIR HFA/PROVENTIL HFA/VENTOLIN HFA) 108 (90 Base) MCG/ACT inhaler Inhale 1-2 puffs into the lungs every 6 hours 1 Inhaler 0     ARIPiprazole (ABILIFY) 5 MG tablet Take 5 mg by mouth       DULoxetine HCl (CYMBALTA PO) Take 90 mg by mouth daily       etonogestrel (IMPLANON/NEXPLANON) 68 MG IMPL 1 each by Subdermal route once       lamoTRIgine (LAMICTAL) 200 MG tablet Take 200 mg by mouth       topiramate (TOPAMAX) 100 MG tablet Take 100 mg by mouth 2 times daily         ALLERGIES:  Allergies   Allergen Reactions     Penicillins Rash     Amoxicillin      Ceclor [Cefaclor]      Urea Rash       LABS/IMAGING/MEDICAL RECORDS REVIEW:  Epic    PHYSICAL EXAM:  /84   Ht 5' 2\" (1.575 m)   Wt 251 lb (113.9 kg)   LMP 11/07/2018   BMI 45.91 kg/m    GENERAL Pt in NAD.  LUNGS: Breathing unlabored.  MUSC: Gait normal  NEURO: Alert and oriented x3.       In summary, Kena Tamayo has Morbid obesity with a Body mass index is 45.91 kg/m . k and the comorbidities stated above. She completed an informational seminar and is a candidate for the laparoscopic gastric sleeve.  She will have to complete the following pre-requisites:    Lose 5 lbs prior to surgery.  Goal Weight: 246 lbs  Have preoperative laboratory tests drawn.   Psychological Evaluation with MMPI and clearance for weight loss surgery.  Psychiatrist letter of support   Achieve clearance from dietitian to see surgeon.  A total of 6 structured dietitian weight loss visits are required by your insurance.  Bariatric Support Group  Replace tooth - Patient describes missing 1 molar EREN and SUSAN along with wisdom teeth.  " Exam will be done in clinic next month    Today in the office we discussed patients medical history. Preoperative, perioperative, and postoperative processes, management, and follow up were addressed.  Next month she will return IN PERSON to discuss the  Gastric sleeve along with its risks and benefits.  GI anatomy will be reviewed as well as lifestyle changes necessary.  Overall patient appears a good candidate for bariatric surgery           Sincerely,     ROGERS Hubbard spent a total of 38 minutes face to face with the patient during today's office visit. Over 50% of this time was spent counseling the patient and/or coordinating care.

## 2020-11-19 VITALS — HEIGHT: 62 IN | WEIGHT: 251 LBS | BODY MASS INDEX: 46.19 KG/M2

## 2020-11-19 ASSESSMENT — MIFFLIN-ST. JEOR: SCORE: 1796.78

## 2020-11-29 ENCOUNTER — HEALTH MAINTENANCE LETTER (OUTPATIENT)
Age: 35
End: 2020-11-29

## 2020-12-15 ENCOUNTER — TELEPHONE (OUTPATIENT)
Dept: SURGERY | Facility: CLINIC | Age: 35
End: 2020-12-15

## 2020-12-15 NOTE — TELEPHONE ENCOUNTER
Jakub Amaral,  Pt had to cancel her appts for today due to work meeting. The were F2F, but I think made before covid-spike policy.   I told her I'd check to see if they can still be F2F and call her back to sched either F2F or VV.  Maybe Bonita really needs to see her?  Let me know, and thanks!  Gil

## 2020-12-28 ENCOUNTER — TELEPHONE (OUTPATIENT)
Dept: SURGERY | Facility: CLINIC | Age: 35
End: 2020-12-28

## 2020-12-28 NOTE — TELEPHONE ENCOUNTER
Pt wants to know if she can switch appts tomorrow 12/29 to vv    [phone]  **ok to leave a detailed message

## 2021-01-05 ENCOUNTER — VIRTUAL VISIT (OUTPATIENT)
Dept: SURGERY | Facility: CLINIC | Age: 36
End: 2021-01-05
Payer: COMMERCIAL

## 2021-01-05 ENCOUNTER — OFFICE VISIT (OUTPATIENT)
Dept: SURGERY | Facility: CLINIC | Age: 36
End: 2021-01-05
Payer: COMMERCIAL

## 2021-01-05 VITALS — HEIGHT: 62 IN | WEIGHT: 250 LBS | BODY MASS INDEX: 46.01 KG/M2

## 2021-01-05 VITALS
BODY MASS INDEX: 46.01 KG/M2 | RESPIRATION RATE: 16 BRPM | HEIGHT: 62 IN | SYSTOLIC BLOOD PRESSURE: 121 MMHG | OXYGEN SATURATION: 97 % | WEIGHT: 250 LBS | TEMPERATURE: 97.7 F | DIASTOLIC BLOOD PRESSURE: 81 MMHG | HEART RATE: 98 BPM

## 2021-01-05 DIAGNOSIS — E66.01 MORBID OBESITY (H): ICD-10-CM

## 2021-01-05 DIAGNOSIS — Z71.89 ENCOUNTER FOR PRE-BARIATRIC SURGERY COUNSELING AND EDUCATION: ICD-10-CM

## 2021-01-05 DIAGNOSIS — E66.01 MORBID OBESITY WITH BODY MASS INDEX (BMI) OF 45.0 TO 49.9 IN ADULT (H): Primary | ICD-10-CM

## 2021-01-05 PROCEDURE — 99214 OFFICE O/P EST MOD 30 MIN: CPT | Performed by: PHYSICIAN ASSISTANT

## 2021-01-05 PROCEDURE — 97803 MED NUTRITION INDIV SUBSEQ: CPT | Mod: 95 | Performed by: DIETITIAN, REGISTERED

## 2021-01-05 RX ORDER — PROPRANOLOL HYDROCHLORIDE 20 MG/1
20 TABLET ORAL DAILY
COMMUNITY
End: 2022-07-13

## 2021-01-05 ASSESSMENT — MIFFLIN-ST. JEOR
SCORE: 1792.24
SCORE: 1792.24

## 2021-01-05 NOTE — PATIENT INSTRUCTIONS
Jakub Escudero!      It was great to see you again! Here's the information we discussed:      Vitamins/Minerals:  [email or web address]        Here are the goals we discussed for this first month:  1. Begin taking a daily multivitamin, a calcium supplement and a vitamin D supplement - the doses/requirements for these are in the vitamin/mineral handout link above. The easiest schedule is to take calcium with your meals, and take everything else at bedtime.   2. Increase exercise - start with 2-3x/week at your new gym  3. Continue to practice eating slowly, chewing well, and not drinking with your meals        Your next visit can be scheduled via the call center at  and should be in one month. Reach out sooner if you have questions. Have a great week!     Myranda Mata RD, LD  Clinical Dietitian

## 2021-01-05 NOTE — PROGRESS NOTES
"The patient has been notified of following:      \"This video visit will be conducted via a call between you and your physician/provider. We have found that certain health care needs can be provided without the need for an in-person physical exam.  This service lets us provide the care you need with a video conversation.  If a prescription is necessary we can send it directly to your pharmacy.  If lab work is needed we can place an order for that and you can then stop by our lab to have the test done at a later time.     Video visits are billed at different rates depending on your insurance coverage.  Please reach out to your insurance provider with any questions.     If during the course of the call the physician/provider feels a video visit is not appropriate, you will not be charged for this service.\"     Patient has given verbal consent for Video visit? Yes  How would you like to obtain your AVS? MyChart  If you are dropped from the video visit, the video invite should be resent to: Text to cell phone: [phone]  Will anyone else be joining your video visit? No        Video-Visit Details     Type of service:  Video Visit     Video Start Time: 1:31pm  Video End Time: 1:48pm    Originating Location (pt. Location): Home     Distant Location (provider location): Provider Remote Setting     Platform used for Video Visit: [facility]      PRE SURGICAL WEIGHT LOSS NUTRITION APPOINTMENT    Kena Tamayo  1985  female  [id number]  35 year old    ASSESSMENT    Desired Surgical Procedure: gastric sleeve    REASON FOR VISIT:  Kena Tamayo is a 35 year old year old female presents today for a pre-surgical weight loss follow-up appointment. Patient accompanied by self.    DIAGNOSIS:  Weight Status Obesity Grade III BMI >40    ANTHROPOMETRICS:  Height: 5' 2\"  Initial Weight: 251 lbs     Weight last visit: 251 lbs    Current weight: 250 lbs 0 oz   BMI: Body mass index is 45.73 kg/m .    VITAMINS AND " MINERALS:  None      NUTRITION HISTORY:  Breakfast: cereal (Willam Charms or granola w/1% milk)  fried egg sandwich (bread, egg, cheese)  Lunch: macaroni  pizza  fried egg sandwich   Supper: fried egg sandwich   Snacks: rare   Fluids Consumed: Water (32oz+), soda (decaf, 16 oz)  Eating slower: Yes  Chewing foods thoroughly: Sometimes  Take 20-30 minutes to consume each meal: Sometimes  Fluids and meals separate by at least 30 minutes: Sometimes  Carbonation: reducing  Caffeine: reducing  Additional Information: Reviewed vitamin regimen per patient request. Pt plans to join gym this month. Pt trying not to snack between meals. Successful in switching from regular to decaf soda.     PHYSICAL ACTIVITY:  Plans to join gym this month     DIAGNOSIS:  Previous Nutrition Diagnosis: Obesity related to long history of self- monitoring deficit and excessive energy intake evidenced by BMI of 45.91 kg/m2  No change, modified below    Previous goals:   Begin taking multivitamin, calcium and vitamin D per guidelines - not met  Eat meals off smaller plate - partially met  Increase exercise to 4-5x/week - not met    Current Nutrition Diagnosis: Obesity related to long history of self-monitoring deficit and excessive energy intake as evidenced by BMI of 45.73 kg/m2.    INTERVENTION:  Nutrition Prescription: Recommended energy/nutrient modification.    GOALS:  Begin taking multivitamin, calcium and vitamin D per guidelines  Exercise 2-3x/week at gym  Continue to practice eating slowly, chewing well, and  fluids and meals by 30 minutes    Intervention:  - Discussed progress towards previous goals.  - Reinforced importance of making behavior changes in preparation for bariatric surgery.   - Assessed learning needs and learning preferences       NUTRITION MONITORING AND EVALUATION:  Anticipated compliance: fair-good  Patient demonstrated fair-good understanding.     Follow up: Continue to monitor patient closely regarding  weight loss and diet.  # of visits needed: 4  Cleared by RD: No     TIME SPENT WITH PATIENT: 17 minutes    Myranda Mata RD, LD  Clinical Dietitian   JESICA QUINTANILLA      Physical Exam

## 2021-01-05 NOTE — PROGRESS NOTES
"        Pre Op Bariatric Surgery Note        RE: Kena Tamayo  MR#: [id number]  : 1985  VISIT DATE: 2021      Dear Clinic, [facility],      HISTORY OF PRESENT ILLNESS:  Patient presents today for further pre op bariatric education after initially being seen 2020.  Her task list items were reviewed:      Lose 5 lbs prior to surgery.  Goal Weight: 246 lbs - today 250 lbs  Have preoperative laboratory tests drawn. - Not done  Psychological Evaluation with MMPI and clearance for weight loss surgery. - not done  Psychiatrist letter of support - not done  Achieve clearance from dietitian to see surgeon.  A total of 6 structured dietitian weight loss visits are required by your insurance.  Bariatric Support Group - not done  Replace tooth - Patient describes missing 1 molar EREN and SUSAN along with wisdom teeth.- Will remove this task list item      PHYSICAL EXAM:  /81 (BP Location: Right arm, Patient Position: Sitting, Cuff Size: Adult Large)   Pulse 98   Temp 97.7  F (36.5  C)   Resp 16   Ht 5' 2\" (1.575 m)   Wt 250 lb (113.4 kg)   LMP 2020 (Approximate)   SpO2 97%   Breastfeeding No   BMI 45.73 kg/m       GENERAL:  Good development and normal affect in no acute distress. Patient is alert and orientated x 3 and answers all questions appropriately.  HEENT: Head is normocephalic, nontraumatic. Pupils equal and round without conjunctival injection. Neck is supple without lymphadenopathy, thyroidmegaly, or mass. Trachea midline. Dentition WNL. Missing 1 EREN and 1 SUSAN tooth  CARDIOVASCULAR:  Regular rate and rhythm without murmurs, rubs, or gallops.  RESPIRATORY: Lungs are clear to auscultation bilaterally with good breath sounds.  GASTROINTESTINAL: Abdomen is obese, nondistended, soft, nontender, without organomegaly or masses. No bruits.  LOWER EXTREMITIES: No LE edema bilaterally, no cyanosis, ulceration, or chronic venous stasis noted.  MUSCULOSKELETAL:  Moves all 4 " extremities equal and strong. Has a normal gait.   NEUROLOGIC: Cranial nerves II-XII grossly intact.  SKIN: No intertriginous irritation or rash.       Today in the office we discussed gastric sleeve surgery. Preoperative, perioperative, and postoperative processes, management, and follow up were reviewed. Risks and benefits were outlined including the risk of death, PE, DVT, ulcer, worsening GERD, N/V, stricture, hernia, wound infection, weight regain, and vitamin deficiencies. I emphasized exercise and activity along with appropriate food choice as the main foundation for weight loss with surgery providing surgical reinforcement of this.  A goal sheet and support group handout were given to the patient. Patient contract was signed.    Once the patient has completed their task list and there are no further recommendations, they will see the surgeon for consultation for bariatric surgery.  All questions were answered. Patient verbalizes understanding of the process to surgery and expectations for the postoperative period including the need for lifelong lifestyle changes, vitamin supplementation, and laboratory monitoring.          Sincerely,    Bonita Galdamez PA-C    30 minutes spent on the date of the encounter doing chart review, review of test results, patient visit and documentation

## 2021-01-08 PROBLEM — E66.01 MORBID OBESITY WITH BODY MASS INDEX (BMI) OF 45.0 TO 49.9 IN ADULT (H): Status: ACTIVE | Noted: 2020-11-03

## 2021-01-20 DIAGNOSIS — Z13.0 SCREENING FOR IRON DEFICIENCY ANEMIA: ICD-10-CM

## 2021-01-20 DIAGNOSIS — F31.81 BIPOLAR 2 DISORDER (H): ICD-10-CM

## 2021-01-20 DIAGNOSIS — Z13.0 SCREENING FOR ENDOCRINE, NUTRITIONAL, METABOLIC AND IMMUNITY DISORDER: ICD-10-CM

## 2021-01-20 DIAGNOSIS — Z13.29 SCREENING FOR ENDOCRINE, NUTRITIONAL, METABOLIC AND IMMUNITY DISORDER: ICD-10-CM

## 2021-01-20 DIAGNOSIS — E66.01 MORBID OBESITY WITH BMI OF 45.0-49.9, ADULT (H): ICD-10-CM

## 2021-01-20 DIAGNOSIS — Z79.899 NEED FOR PROPHYLACTIC CHEMOTHERAPY: Primary | ICD-10-CM

## 2021-01-20 DIAGNOSIS — Z13.21 SCREENING FOR ENDOCRINE, NUTRITIONAL, METABOLIC AND IMMUNITY DISORDER: ICD-10-CM

## 2021-01-20 DIAGNOSIS — Z13.228 SCREENING FOR ENDOCRINE, NUTRITIONAL, METABOLIC AND IMMUNITY DISORDER: ICD-10-CM

## 2021-01-20 LAB
ALBUMIN SERPL-MCNC: 3.3 G/DL (ref 3.4–5)
ALP SERPL-CCNC: 103 U/L (ref 40–150)
ALT SERPL W P-5'-P-CCNC: 23 U/L (ref 0–50)
ANION GAP SERPL CALCULATED.3IONS-SCNC: 4 MMOL/L (ref 3–14)
AST SERPL W P-5'-P-CCNC: 16 U/L (ref 0–45)
BILIRUB SERPL-MCNC: 0.3 MG/DL (ref 0.2–1.3)
BUN SERPL-MCNC: 9 MG/DL (ref 7–30)
CALCIUM SERPL-MCNC: 9.2 MG/DL (ref 8.5–10.1)
CHLORIDE SERPL-SCNC: 104 MMOL/L (ref 94–109)
CHOLEST SERPL-MCNC: 164 MG/DL
CO2 SERPL-SCNC: 27 MMOL/L (ref 20–32)
CREAT SERPL-MCNC: 0.7 MG/DL (ref 0.52–1.04)
DEPRECATED CALCIDIOL+CALCIFEROL SERPL-MC: 37 UG/L (ref 20–75)
ERYTHROCYTE [DISTWIDTH] IN BLOOD BY AUTOMATED COUNT: 18.8 % (ref 10–15)
GFR SERPL CREATININE-BSD FRML MDRD: >90 ML/MIN/{1.73_M2}
GLUCOSE SERPL-MCNC: 86 MG/DL (ref 70–99)
GLUCOSE SERPL-MCNC: 88 MG/DL (ref 70–99)
HBA1C MFR BLD: 5.3 % (ref 0–5.6)
HCT VFR BLD AUTO: 34.2 % (ref 35–47)
HDLC SERPL-MCNC: 44 MG/DL
HGB BLD-MCNC: 10.3 G/DL (ref 11.7–15.7)
LDLC SERPL CALC-MCNC: 89 MG/DL
MCH RBC QN AUTO: 22.5 PG (ref 26.5–33)
MCHC RBC AUTO-ENTMCNC: 30.1 G/DL (ref 31.5–36.5)
MCV RBC AUTO: 75 FL (ref 78–100)
NONHDLC SERPL-MCNC: 120 MG/DL
PLATELET # BLD AUTO: 494 10E9/L (ref 150–450)
POTASSIUM SERPL-SCNC: 4.1 MMOL/L (ref 3.4–5.3)
PROT SERPL-MCNC: 7.8 G/DL (ref 6.8–8.8)
RBC # BLD AUTO: 4.57 10E12/L (ref 3.8–5.2)
SODIUM SERPL-SCNC: 135 MMOL/L (ref 133–144)
TRIGL SERPL-MCNC: 154 MG/DL
WBC # BLD AUTO: 9.5 10E9/L (ref 4–11)

## 2021-01-20 PROCEDURE — 36415 COLL VENOUS BLD VENIPUNCTURE: CPT | Performed by: PHYSICIAN ASSISTANT

## 2021-01-20 PROCEDURE — 80053 COMPREHEN METABOLIC PANEL: CPT | Performed by: PHYSICIAN ASSISTANT

## 2021-01-20 PROCEDURE — 82947 ASSAY GLUCOSE BLOOD QUANT: CPT | Mod: 59 | Performed by: NURSE PRACTITIONER

## 2021-01-20 PROCEDURE — 80061 LIPID PANEL: CPT | Performed by: NURSE PRACTITIONER

## 2021-01-20 PROCEDURE — 85027 COMPLETE CBC AUTOMATED: CPT | Performed by: PHYSICIAN ASSISTANT

## 2021-01-20 PROCEDURE — 83036 HEMOGLOBIN GLYCOSYLATED A1C: CPT | Performed by: PHYSICIAN ASSISTANT

## 2021-01-20 PROCEDURE — 82306 VITAMIN D 25 HYDROXY: CPT | Performed by: PHYSICIAN ASSISTANT

## 2021-01-22 ENCOUNTER — TELEPHONE (OUTPATIENT)
Dept: SURGERY | Facility: CLINIC | Age: 36
End: 2021-01-22

## 2021-01-22 NOTE — CONFIDENTIAL NOTE
Patient is planning to have weight loss surgery.  Saw psychiatrist yesterday who thinks may need to get off for surgery as it is too slow acting.  The name of the med is Cymbalta.    Informed patient that this writer will send to provider for direction.  May not get back to her re: the answer early next week.  Patient verbalized understanding and is agreeable to plan.  Juanita Malhotra, MS, RD, RN

## 2021-01-26 NOTE — CONFIDENTIAL NOTE
Spoke with ROGERS Mesa re: [place].  Per Bonita, based on surgery alone, there is no need to recommend a change in medication.  However ultimately it is up to her psychiatrist.    Attempted to call patient who didn't answer.  LM for patient to view answer to her question from last week in My Chart and to call if questions.  Juanita Malhotra, MS, RD, RN

## 2021-02-05 ENCOUNTER — VIRTUAL VISIT (OUTPATIENT)
Dept: SURGERY | Facility: CLINIC | Age: 36
End: 2021-02-05
Payer: COMMERCIAL

## 2021-02-05 VITALS — BODY MASS INDEX: 45.73 KG/M2 | HEIGHT: 62 IN

## 2021-02-05 DIAGNOSIS — E66.01 MORBID OBESITY WITH BODY MASS INDEX (BMI) OF 45.0 TO 49.9 IN ADULT (H): ICD-10-CM

## 2021-02-05 PROCEDURE — 97803 MED NUTRITION INDIV SUBSEQ: CPT | Mod: 95 | Performed by: DIETITIAN, REGISTERED

## 2021-02-05 NOTE — PROGRESS NOTES
"Kena is a 35 year old who is being evaluated via a billable video visit.      How would you like to obtain your AVS? MyChart  If the video visit is dropped, the invitation should be resent by: Text to cell phone: [phone]  Will anyone else be joining your video visit? No      Video Start Time: 1:30pm    Video-Visit Details    Type of service:  Video Visit    Video End Time: 1:54pm    Originating Location (pt. Location): Home    Distant Location (provider location):  Provider Remote Setting    Platform used for Video Visit: [facility]     PRE SURGICAL WEIGHT LOSS NUTRITION APPOINTMENT    Kena Tamayo  1985  female  [id number]  35 year old    ASSESSMENT    Desired Surgical Procedure: gastric sleeve    REASON FOR VISIT:  Kena Tamayo is a 35 year old year old female presents today for a pre-surgical weight loss follow-up appointment. Patient accompanied by self.    DIAGNOSIS:  Weight Status Obesity Grade III BMI >40    ANTHROPOMETRICS:  Height: 5' 2\"    Initial Weight: 251 lbs     Weight last visit: 250 lbs    Current weight: n/a      VITAMINS AND MINERALS:  1 Multivitamin with Minerals (HS)  (pt unsure of dose) mg Calcium with Vitamin D (HS)  (pt unsure of dose) International units Vitamin D (HS)      NUTRITION HISTORY:  Breakfast: [wakes at 6:15am, eats at 8:30am] cereal (honey bunches of oats or Life w/1%)   Toast (1-2 pieces w/peanut butter)  Lunch: [11-11:15am] skips 2-3x/day  pasta + cucumbers w/ranch   Supper: [5:30-6pm] (same as lunch)   Snacks: string cheese (4 per day)    Fluids Consumed: Water (48oz+), soda (2 cans/week)  Eating slower: Yes  Chewing foods thoroughly: Yes  Take 20-30 minutes to consume each meal: Yes  Fluids and meals separate by at least 30 minutes: Yes  Carbonation: yes  Caffeine: yes  Additional Information: Pt struggling with \"mind hunger\" in the evenings. Discussed alternatives to mindless snacking. Reviewed appropriate meal structure and patterns. Encouraged pt to " increase protein and fiber. Pt is a vegetarian and struggles to find protein sources - will send protein list. Reviewed task list and timeline to surgery. Patient asking for appetite suppressant - discussed pros/cons to utilizing medications to lose weight prior to surgery. Recommended work on lifestyle habits first as she has several things she could improve on. Discussed can always schedule additional visit with provider to discuss appropriateness of medication.     PHYSICAL ACTIVITY:  Type: Gym membership - treadmill + weight training  Frequency: 3 (days per week)  Duration: 60(minutes)     DIAGNOSIS:  Previous Nutrition Diagnosis: Obesity related to long history of self- monitoring deficit and excessive energy intake evidenced by BMI of 45.73 kg/m2  No change, modified below    Previous goals:   Begin taking multivitamin, calcium and vitamin D per guidelines - partially met  Exercise 2-3x/week at gym - met  Continue to practice eating slowly, chewing well, and  fluids and meals by 30 minutes - met       Current Nutrition Diagnosis: Obesity related to long history of self-monitoring deficit and excessive energy intake as evidenced by previous BMI of >40 kg/m2.    INTERVENTION:  Nutrition Prescription: Recommended energy/nutrient modification.    GOALS:  Take Calcium per guidelines  Increase protein and fiber at meals  Avoid skipping meals  Eat breakfast within 1h of waking    Intervention:  - Discussed progress towards previous goals.  - Reinforced importance of making behavior changes in preparation for bariatric surgery.   - Assessed learning needs and learning preferences       NUTRITION MONITORING AND EVALUATION:  Anticipated compliance: fair-good  Patient demonstrated fair-good understanding.       Follow up: Continue to monitor patient closely regarding weight loss and diet.  # of visits needed: 3  Cleared by RD: No     TIME SPENT WITH PATIENT: 24 minutes      Myranda Mata RD, LD  Clinical  Dietitian   HPI      ROS      Physical Exam

## 2021-02-05 NOTE — PATIENT INSTRUCTIONS
Jakub Escudero!      Great to see you today! Here's a list of the goals we discussed:    1. Take Calcium as follows: 500mg 3x/day OR 600mg 2x/day. This should be separate from your multivitamin. The easiest way to schedule everything is to take the calcium 2-3x throughout the day, and take everything else at bedtime.     2. Eat breakfast within 1 hour of waking up    3. Avoid skipping meals    4. Increase protein at your meals. Here is a list of sources (the second page contains dairy/vegetarian proteins): http://[email or web address]    5. Increase fiber at meals by including more fruits/vegetables. Here is a guide for portions at meals: https://www.choosemyplate.gov/        Otherwise, keep up the good work! You'll need to follow up in 1 month. This can be scheduled via the call center at . Have a great weekend!      Myranda Mata RD, LD  Clinical Dietitian

## 2021-03-31 ENCOUNTER — VIRTUAL VISIT (OUTPATIENT)
Dept: SURGERY | Facility: CLINIC | Age: 36
End: 2021-03-31
Payer: COMMERCIAL

## 2021-03-31 PROCEDURE — 97803 MED NUTRITION INDIV SUBSEQ: CPT | Mod: 95 | Performed by: DIETITIAN, REGISTERED

## 2021-03-31 NOTE — PROGRESS NOTES
"Video-Visit Details    Type of service:  Video Visit    Video Start Time (time video started): 11:11    Video End Time (time video stopped): 11:36    Originating Location (pt. Location): Other work    Distant Location (provider location):  [facility]     Mode of Communication:  Video Conference via [employer]          PRE SURGICAL WEIGHT LOSS NUTRITION APPOINTMENT    Kena Tamayo  1985  female  [id number]  35 year old    ASSESSMENT    Desired Surgical Procedure: gastric sleeve    REASON FOR VISIT:  Kena Tamayo is a 35 year old year old female presents today for a pre-surgical weight loss follow-up appointment. Patient accompanied by self.    DIAGNOSIS:  Weight Status n/a    ANTHROPOMETRICS:  Height: 62\"   Initial Weight: 251 lb     Weight last visit: 250 lb    Current weight: n/a- no scale yet   BMI: n/a  Kg/(m^2).  Wt Readings from Last 5 Encounters:   01/05/21 113.4 kg (250 lb)   01/05/21 113.4 kg (250 lb)   11/19/20 113.9 kg (251 lb)   11/17/20 113.9 kg (251 lb)   11/03/20 113.4 kg (250 lb)       VITAMINS AND MINERALS:  1 Multivitamin with Minerals-bed time  600 mg Calcium with Vitamin D-morning/ afternoon  5000 International units Vitamin D      NUTRITION HISTORY:  Breakfast: Honey bunches of oat + skim or 1%milk  Lunch: pasta sometimes with red sauce or peanut butter or cheese sandwich or veggie/cheese sub  Supper: eggs, toast or lettuce salad + sometimes hard cooked eggs  Snacks: string cheese, one protein drink per week  Fluids Consumed: Water  Eating slower: Yes  Chewing foods thoroughly: Yes  Take 20-30 minutes to consume each meal: Yes  Fluids and meals separate by at least 30 minutes: Yes  Carbonation: no  Caffeine: no  Additional Information: Patient is a lactoovo vegetarian. She is willing to add black beans and cottage cheese more often. She is going to [place] soon and will buy a scale after the trip. Her and her spouse have 5 kids and they have " a busy schedule at home. Patient asked to be added to the support group distribution list.    PHYSICAL ACTIVITY:  Type: walking  Frequency: 2-3 (days per week)  Duration: 30(minutes)     DIAGNOSIS:  Previous Nutrition Diagnosis: Obesity related to long history of self- monitoring deficit and excessive energy intake evidenced by BMI of n/a kg/m2  No change, modified below    Previous goals:   Take Calcium per guidelines-met  Increase protein and fiber at meals-improving  Avoid skipping meals-met  Eat breakfast within 1h of waking-met very close to 1 hour of waking       Current Nutrition Diagnosis: Obesity related to long history of self-monitoring deficit and excessive energy intake as evidenced by BMI of n/a kg/m2.    INTERVENTION:  Nutrition Prescription: Recommended energy/nutrient modification.    GOALS:  Continue to focus on eating protein with each meal (may want to try adding unflavored protein powder to foods)-see protein food list sent via [employer]  Increase exercise to 4X per week for 30 minutes  Schedule visit with primary care provider to discuss labs (complete blood count)       Intervention:  - Discussed progress towards previous goals.  - Reinforced importance of making behavior changes in preparation for bariatric surgery.   - Assessed learning needs and learning preferences       NUTRITION MONITORING AND EVALUATION:  Anticipated compliance: fair-good  Patient demonstrated fair-good understanding.       Follow up: Continue to monitor patient closely regarding weight loss and diet.  # of visits needed: 2  Cleared by RD: No     TIME SPENT WITH PATIENT: 25 minutes

## 2021-05-05 ENCOUNTER — VIRTUAL VISIT (OUTPATIENT)
Dept: FAMILY MEDICINE | Facility: CLINIC | Age: 36
End: 2021-05-05
Payer: COMMERCIAL

## 2021-05-05 DIAGNOSIS — D50.9 MICROCYTIC ANEMIA: ICD-10-CM

## 2021-05-05 DIAGNOSIS — Z78.9 VEGETARIAN DIET: ICD-10-CM

## 2021-05-05 DIAGNOSIS — D50.9 MICROCYTIC ANEMIA: Primary | ICD-10-CM

## 2021-05-05 LAB
BASOPHILS # BLD AUTO: 0 10E9/L (ref 0–0.2)
BASOPHILS NFR BLD AUTO: 0.2 %
DIFFERENTIAL METHOD BLD: ABNORMAL
EOSINOPHIL # BLD AUTO: 0.2 10E9/L (ref 0–0.7)
EOSINOPHIL NFR BLD AUTO: 2.2 %
ERYTHROCYTE [DISTWIDTH] IN BLOOD BY AUTOMATED COUNT: 16.5 % (ref 10–15)
FERRITIN SERPL-MCNC: 20 NG/ML (ref 12–150)
FOLATE SERPL-MCNC: 26.6 NG/ML
HCT VFR BLD AUTO: 33.3 % (ref 35–47)
HGB BLD-MCNC: 10.5 G/DL (ref 11.7–15.7)
IRON SATN MFR SERPL: 7 % (ref 15–46)
IRON SERPL-MCNC: 27 UG/DL (ref 35–180)
LYMPHOCYTES # BLD AUTO: 2.8 10E9/L (ref 0.8–5.3)
LYMPHOCYTES NFR BLD AUTO: 29.2 %
MCH RBC QN AUTO: 24.4 PG (ref 26.5–33)
MCHC RBC AUTO-ENTMCNC: 31.5 G/DL (ref 31.5–36.5)
MCV RBC AUTO: 77 FL (ref 78–100)
MONOCYTES # BLD AUTO: 0.7 10E9/L (ref 0–1.3)
MONOCYTES NFR BLD AUTO: 7 %
NEUTROPHILS # BLD AUTO: 5.9 10E9/L (ref 1.6–8.3)
NEUTROPHILS NFR BLD AUTO: 61.4 %
PLATELET # BLD AUTO: 416 10E9/L (ref 150–450)
RBC # BLD AUTO: 4.31 10E12/L (ref 3.8–5.2)
TIBC SERPL-MCNC: 399 UG/DL (ref 240–430)
VIT B12 SERPL-MCNC: 376 PG/ML (ref 193–986)
WBC # BLD AUTO: 9.5 10E9/L (ref 4–11)

## 2021-05-05 PROCEDURE — 83550 IRON BINDING TEST: CPT | Performed by: PHYSICIAN ASSISTANT

## 2021-05-05 PROCEDURE — 36415 COLL VENOUS BLD VENIPUNCTURE: CPT | Performed by: PHYSICIAN ASSISTANT

## 2021-05-05 PROCEDURE — 82607 VITAMIN B-12: CPT | Performed by: PHYSICIAN ASSISTANT

## 2021-05-05 PROCEDURE — 85025 COMPLETE CBC W/AUTO DIFF WBC: CPT | Performed by: PHYSICIAN ASSISTANT

## 2021-05-05 PROCEDURE — 82746 ASSAY OF FOLIC ACID SERUM: CPT | Performed by: PHYSICIAN ASSISTANT

## 2021-05-05 PROCEDURE — 99214 OFFICE O/P EST MOD 30 MIN: CPT | Mod: 95 | Performed by: PHYSICIAN ASSISTANT

## 2021-05-05 PROCEDURE — 82728 ASSAY OF FERRITIN: CPT | Performed by: PHYSICIAN ASSISTANT

## 2021-05-05 PROCEDURE — 83540 ASSAY OF IRON: CPT | Performed by: PHYSICIAN ASSISTANT

## 2021-05-05 RX ORDER — TRAZODONE HYDROCHLORIDE 50 MG/1
TABLET, FILM COATED ORAL
COMMUNITY
Start: 2021-04-28 | End: 2023-12-14

## 2021-05-05 NOTE — PROGRESS NOTES
"Kena is a 35 year old who is being evaluated via a billable video visit.      How would you like to obtain your AVS? MyChart  If the video visit is dropped, the invitation should be resent by: Text to cell phone: [phone]  Will anyone else be joining your video visit? No    Video Start Time: 2:28pm    Assessment & Plan     Microcytic anemia  Reviewed pt's cbc with her from Jan 2021.   Showing mild microcytic hypochromic anemia .  She is ~ 1 yr s/p hyst for abnormal uterine bleeding.   No other abnormal bleeding.   She eats a vegetarian diet   She is already on a Flintstones multivit w/Iron and extra B12.  Recheck her labs.   If labs today continues to iron deficiency pattern- will add additional iron supplement to her multivit. Requested she send the dose of iron in the Flintstones chewable she is taking.   Recheck labs in 3-4 weeks. If not improving to hematology prior to surgery.    - CBC with platelets and differential; Future  - Iron and iron binding capacity; Future  - Ferritin; Future    Vegetarian diet  - Vitamin B12; Future  - Folate; Future     BMI:   Estimated body mass index is 45.73 kg/m  as calculated from the following:    Height as of 2/5/21: 1.575 m (5' 2\").    Weight as of 1/5/21: 113.4 kg (250 lb).   Weight management plan: Patient referred to endocrine and/or weight management specialty    Follow Up: The patient was instructed to contact clinic for worsening symptoms, non-improvement in time frame as expected/discussed, and for questions regarding medications or treatment plan. For virtual visits, the patient was advised to be seen for in person evaluation if symptoms or condition are worsening or non-improvement as expected.       Return in about 4 weeks (around 6/2/2021) for Recheck, With PCP.    ROGERS Diaz [facility] MAIRA Escudero is a 35 year old who presents for the following health issues : abnormal lab test     JESICA Watkins , provider " who ordered labs were concerned about the blood cell count prior to surgery. They would like provider to send over the plan to them for this so that she can continue to surgery   Per pt labs were done 1/20/2021  ---  Provider Hx:   Pt's bariatric team ordered labs in Jan 2021 and advised she see Primary care to follow up on abnormal CBC prior to bariatric surgery.       Component      Latest Ref Rng & Units 1/20/2021   WBC      4.0 - 11.0 10e9/L 9.5   RBC Count      3.8 - 5.2 10e12/L 4.57   Hemoglobin      11.7 - 15.7 g/dL 10.3 (L)   Hematocrit      35.0 - 47.0 % 34.2 (L)   MCV      78 - 100 fl 75 (L)   MCH      26.5 - 33.0 pg 22.5 (L)   MCHC      31.5 - 36.5 g/dL 30.1 (L)   RDW      10.0 - 15.0 % 18.8 (H)   Platelet Count      150 - 450 10e9/L 494 (H)       She states she does not recall being told she is anemic  She had hyst May 2020 through [facility] due to heavy bleeding and painful periods.     Vegetarian- low intake of protein.  She is on FLintstones chewable multivit with iron and B12 and calcium - since Sept 2020.    No NSAIDs, no alcohol. No PPIs.   Denies other abnormal bleeding  Denies abdominal pain, N/V, diarrhea.    She does not smoke  Denies any alcohol or substance use     Review of Systems   Constitutional, HEENT, cardiovascular, pulmonary, gi and gu systems are negative, except as otherwise noted.      Objective           Vitals:  No vitals were obtained today due to virtual visit.    Physical Exam   GENERAL: Healthy, alert and no distress  EYES: Eyes grossly normal to inspection.  No discharge or erythema, or obvious scleral/conjunctival abnormalities.  HENT: Normal cephalic/atraumatic.  External ears, nose and mouth without ulcers or lesions.  No nasal drainage visible.  RESP: No audible wheeze, cough, or visible cyanosis.  No visible retractions or increased work of breathing.    SKIN: Visible skin clear. No significant rash, abnormal pigmentation or lesions.  NEURO: Cranial nerves grossly intact.   Mentation and speech appropriate for age.  PSYCH: Mentation appears normal, affect normal/bright, judgement and insight intact, normal speech and appearance well-groomed.                Video-Visit Details    Type of service:  Video Visit    Video End Time:2:44pm    Originating Location (pt. Location): Other work    Distant Location (provider location):  [facility]     Platform used for Video Visit: [email or web address]

## 2021-05-05 NOTE — PATIENT INSTRUCTIONS
Your labs from Jan 2021 show you are anemic (low hemoglobin and low red blood cells). This appears to be from not enough iron in your diet. The body needs iron to make red blood cells.     We will repeat today CBC today along with iron labs.   If levels still low, I will send a prescription to the pharmacy for an iron supplement.   Take once daily, with small glass of orange juice (helps to absorb the iron).   Plan for repeat labs in 1 month.

## 2021-05-06 DIAGNOSIS — D50.9 MICROCYTIC ANEMIA: Primary | ICD-10-CM

## 2021-05-06 RX ORDER — FERROUS SULFATE 325(65) MG
325 TABLET ORAL
Qty: 30 TABLET | Refills: 1 | Status: SHIPPED | OUTPATIENT
Start: 2021-05-06 | End: 2021-11-08

## 2021-06-03 ENCOUNTER — VIRTUAL VISIT (OUTPATIENT)
Dept: SURGERY | Facility: CLINIC | Age: 36
End: 2021-06-03
Payer: COMMERCIAL

## 2021-06-03 DIAGNOSIS — E66.01 MORBID OBESITY WITH BODY MASS INDEX (BMI) OF 45.0 TO 49.9 IN ADULT (H): ICD-10-CM

## 2021-06-03 PROCEDURE — 97803 MED NUTRITION INDIV SUBSEQ: CPT | Mod: 95 | Performed by: DIETITIAN, REGISTERED

## 2021-06-03 NOTE — PROGRESS NOTES
"Video-Visit Details    Type of service:  Video Visit    Video Start Time (time video started): 11:05    Video End Time (time video stopped): 11:28    Originating Location (pt. Location): Other work setting    Distant Location (provider location):  [facility] [place]     Mode of Communication:  Video Conference via [employer]    PRE SURGICAL WEIGHT LOSS NUTRITION APPOINTMENT    Kena Tamayo  1985  female  [id number]  35 year old    ASSESSMENT    Desired Surgical Procedure: gastric sleeve    REASON FOR VISIT:  Kena Tamayo is a 35 year old year old female presents today for a pre-surgical weight loss follow-up appointment. Patient accompanied by self.    DIAGNOSIS:  Weight Status Obesity Grade III BMI >40    ANTHROPOMETRICS:  Height:  5'2\"  Initial Weight: 251 lbs      Weight last visit: 250 lbs     Current weight: patient has not bought scale or weighed herself  BMI: N/A  kg/(m^2).    VITAMINS AND MINERALS:  1 Multivitamin with Minerals-bed time  600 mg Calcium with Vitamin D-morning/ afternoon  5000 International units Vitamin D- evening     NUTRITION HISTORY:  Breakfast: Honey bunches of oat + skim or 1%milk  Lunch: pasta sometimes with red sauce or peanut butter or cheese sandwich or veggie/cheese sub  Supper: eggs, toast or lettuce salad + sometimes hard cooked eggs; grilled cheese or burrito   Snacks: string cheese, one protein drink per week; fruit   Fluids Consumed: Water  Eating slower: Yes  Chewing foods thoroughly: Yes  Take 20-30 minutes to consume each meal: Yes  Fluids and meals separate by at least 30 minutes: Yes  Carbonation: None  Caffeine: None   Additional Information:  Patient is a lacto-ovo vegetarian. She is willing to add black beans and cottage cheese more often.  Patient did not purchase scale after her trip to [facility] and is concerned she is not losing weight.  Her and her spouse have 5 kids and they have a busy schedule at home.  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  Patient plans to attend the June support group.  Suggest patient go to PCP clinic for weight as patient feels it would be easier to get weight in clinic than purchase scale.  Patient has made behavior changes for surgery.  Patient would like to have surgery in August before school starts.  Explained process of clearance, surgeon consult, insurance approval and surgery scheduling.     PHYSICAL ACTIVITY:  Type: walking with    Frequency: 6 (days per week)  Duration: 30 (minutes)     DIAGNOSIS:  Previous Nutrition Diagnosis: Obesity related to long history of self- monitoring deficit and excessive energy intake evidenced by previous elevated BMI   No change, modified below    Previous goals:   Continue to focus on eating protein with each meal (may want to try adding unflavored protein powder to foods)-see protein food list sent via [employer]-improving   Increase exercise to 4X per week for 30 minutes-met  Schedule visit with primary care provider to discuss labs (complete blood count)-met    Current Nutrition Diagnosis: Obesity related to long history of self-monitoring deficit and excessive energy intake as evidenced by previous elevated BMI     INTERVENTION:  Nutrition Prescription: Recommended energy/nutrient modification.    GOALS:  Go to PCP clinic for weight check  Add protein choice at every meal  Continue following pre surgery diet guidelines    Intervention:  - Discussed progress towards previous goals.  - Reinforced importance of making behavior changes in preparation for bariatric surgery.   - Assessed learning needs and learning preferences     NUTRITION MONITORING AND EVALUATION:  Expected patient engagement: fair-good   Patient demonstrated good understanding.     Follow up: Continue to monitor patient closely regarding weight loss and diet.  # of visits needed: 1 or until meets weight loss goal   Cleared by RD: No     TIME SPENT WITH PATIENT: 23  minutes    Paul Charlton, RD, LD  [facility] Outpatient Dietitian/Weight Loss Clinic   [phone] (office phone)

## 2021-09-19 ENCOUNTER — HEALTH MAINTENANCE LETTER (OUTPATIENT)
Age: 36
End: 2021-09-19

## 2021-10-03 NOTE — PROGRESS NOTES
"Video-Visit Details    Type of service:  Video Visit    Video Start Time (time video started): 8:59    Video End Time (time video stopped): 9:23    Originating Location (pt. Location): Other work    Distant Location (provider location):  [facility]     Mode of Communication:  Video Conference via [employer]          PRE SURGICAL WEIGHT LOSS NUTRITION APPOINTMENT    Kena Tamayo  1985  female  [id number]  36 year old    ASSESSMENT    Desired Surgical Procedure: gastric sleeve    REASON FOR VISIT:  Kena Tamayo is a 36 year old year old female presents today for a pre-surgical weight loss follow-up appointment. Patient accompanied by self.    DIAGNOSIS:  Weight Status Obesity Grade III BMI >40    ANTHROPOMETRICS:  Height:  62\"  Initial Weight: 251 lb    Weight last visit: 250 lb    Current weight:260 lb per pt    BMI: 47.55  kg/(m^2).    VITAMINS AND MINERALS:   1 Multivitamin with Minerals-bed time  600 mg Calcium with Vitamin D-morning/ afternoon  5000 International units Vitamin D- evening       NUTRITION HISTORY:  Breakfast: Honey Bunches of Oats with 1% milk  Lunch: skipping 2-3X per week or pasta with tomato sauce or cold cereal with 1% milk  Supper: skipping 2-3X per week pasta with tomato sauce, tries to eat corn or broccoli  Snacks: string cheese  Fluids Consumed: Water,   Eating slower: Yes  Chewing foods thoroughly: Yes  Take 20-30 minutes to consume each meal: Yes  Fluids and meals separate by at least 30 minutes: Yes  Carbonation: no  Caffeine: no  Additional Information: Patient is frustrated her weight is up ~ 10 lbs since starting the program. Her Ability was increased 4-5 months ago and she feels this may be causing weight gain. Patient struggles to find protein rich foods on her  vegetarian (lacto ovo) diet. Reports having a very hectic life with 5 kids at home who are in sports. Does not care for protein drinks she has tried. Does not like " many protein rich foods that are vegetarian (will send sources of protein food list). Discussed option of scheduling with her provider to see if she can try a medication for weight loss or switching in to medical weight loss program. Staff message sent to Bonita Galdamez PA-C to give updates on patient.    PHYSICAL ACTIVITY:  Type: walking  Frequency: 3 (days per week)  Duration: 15-20 (minutes)     DIAGNOSIS:  Previous Nutrition Diagnosis: Obesity related to long history of self- monitoring deficit and excessive energy intake evidenced by BMI of n/a kg/m2  No change, modified below    Previous goals:   Go to PCP clinic for weight check-not met, used mother-in laws scale  Add protein choice at every meal-difficult  Continue following pre surgery diet guidelines-partially met    Current Nutrition Diagnosis: Obesity related to long history of self-monitoring deficit and excessive energy intake as evidenced by BMI of 47.55 kg/m2.    INTERVENTION:  Nutrition Prescription: Recommended energy/nutrient modification.    GOALS:  Follow Modified Liquid diet (sent via my chart)  Eat 3 X per day or have a protein drink for 2 meals  Increase exercise to 4-5X per week (see Tips for Weight loss sent via [employer])      Intervention:  - Discussed progress towards previous goals.  - Reinforced importance of making behavior changes in preparation for bariatric surgery.   - Assessed learning needs and learning preferences       NUTRITION MONITORING AND EVALUATION:  Anticipated compliance: fair-poor  Patient demonstrated fair understanding.       Follow up: Continue to monitor patient closely regarding weight loss and diet.  # of visits needed: 2-3 (will depend on progress with weight loss)  Cleared by RD: No     TIME SPENT WITH PATIENT: 23 minutes  Reggie Mcgrath RD, [facility]

## 2021-10-04 ENCOUNTER — VIRTUAL VISIT (OUTPATIENT)
Dept: SURGERY | Facility: CLINIC | Age: 36
End: 2021-10-04
Payer: COMMERCIAL

## 2021-10-04 VITALS — BODY MASS INDEX: 47.55 KG/M2 | WEIGHT: 260 LBS

## 2021-10-04 DIAGNOSIS — E66.01 MORBID OBESITY WITH BODY MASS INDEX (BMI) OF 45.0 TO 49.9 IN ADULT (H): ICD-10-CM

## 2021-10-04 PROCEDURE — 97803 MED NUTRITION INDIV SUBSEQ: CPT | Mod: 95 | Performed by: DIETITIAN, REGISTERED

## 2021-10-11 ENCOUNTER — VIRTUAL VISIT (OUTPATIENT)
Dept: SURGERY | Facility: CLINIC | Age: 36
End: 2021-10-11
Payer: COMMERCIAL

## 2021-10-11 DIAGNOSIS — E66.01 MORBID OBESITY WITH BODY MASS INDEX (BMI) OF 45.0 TO 49.9 IN ADULT (H): Primary | ICD-10-CM

## 2021-10-11 DIAGNOSIS — Z91.199 FAILURE TO ATTEND APPOINTMENT: ICD-10-CM

## 2021-10-11 DIAGNOSIS — N92.0 MENORRHAGIA WITH REGULAR CYCLE: ICD-10-CM

## 2021-11-10 ENCOUNTER — VIRTUAL VISIT (OUTPATIENT)
Dept: SURGERY | Facility: CLINIC | Age: 36
End: 2021-11-10
Payer: COMMERCIAL

## 2021-11-10 PROCEDURE — 97803 MED NUTRITION INDIV SUBSEQ: CPT | Mod: GT | Performed by: DIETITIAN, REGISTERED

## 2021-11-10 NOTE — PROGRESS NOTES
"Video-Visit Details     Type of service:  Video Visit     Video Start Time (time video started): 10:01     Video End Time (time video stopped): 10:18    Originating Location (pt. Location): Home     Distant Location (provider location):  [facility] [place]      Mode of Communication:  Video Conference via [employer]           PRE SURGICAL WEIGHT LOSS NUTRITION APPOINTMENT     Kena Tamayo  1985  female  [id number]  36 year old     ASSESSMENT     Desired Surgical Procedure: gastric sleeve     REASON FOR VISIT:  Kena Tamayo is a 36 year old year old female presents today for a pre-surgical weight loss follow-up appointment. Patient accompanied by self.     DIAGNOSIS:  Weight Status: n/a due to no weight      ANTHROPOMETRICS:  Height:  62\"  Initial Weight:251 lb.     Weight last visit: 260 lb    Current weight:  n/a lb per pt  BMI: n/a  kg/(m^2).     VITAMINS AND MINERALS:   1 Multivitamin with Minerals-bed time  600 mg Calcium with Vitamin D-morning/ afternoon  5000 International units Vitamin D- evening         NUTRITION HISTORY:  Breakfast: 2 eggs on bread  Lunch: protein drink not sure how much protein  Supper: eggs, sometimes green beans or peanut butter sandwich, fruit  Snacks: cucumbers or fresh fruit or cheese stick   Fluids Consumed: Water, protein drinks  Eating slower: Yes  Chewing foods thoroughly: Yes  Take 20-30 minutes to consume each meal: Yes  Fluids and meals separate by at least 30 minutes: Yes  Carbonation: no  Caffeine: no  Additional Information: Patient is on FMLA from work due to a \"mental breakdown.\" She feel like her stress was related to work and does not want to take a break from the surgical weight loss program. She plans to see her provider to see if she can try a medication for weight loss.Follows a lactoovovegetaian diet.      PHYSICAL ACTIVITY:  Type: walking dog  Frequency: 7 (days per week)  Duration: 20-30(minutes) " "     DIAGNOSIS:  Previous Nutrition Diagnosis: Obesity related to long history of self- monitoring deficit and excessive energy intake evidenced by BMI of 47.55 kg/m2  No change, modified below     Previous goals:   Follow Modified Liquid diet (sent via my chart)-not met daily  Eat 3 X per day or have a protein drink for 2 meals-partially met  Increase exercise to 4-5X per week (see Tips for Weight loss sent via [employer])-met     Current Nutrition Diagnosis: Obesity related to long history of self-monitoring deficit and excessive energy intake as evidenced by BMI of n/a kg/m2.     INTERVENTION:  Nutrition Prescription: Recommended energy/nutrient modification.     GOALS:  Get back to following \"Modified Liquid Diet\"  Call Connie Escobar to review her psychological evaluation      Intervention:  - Discussed progress towards previous goals.  - Reinforced importance of making behavior changes in preparation for bariatric surgery.   - Assessed learning needs and learning preferences        NUTRITION MONITORING AND EVALUATION:  Anticipated compliance: fair  Patient demonstrated fair-good understanding.        Follow up: Continue to monitor patient closely regarding weight loss and diet.  # of visits needed: 2-3-sill depend on weight  Cleared by RD: No      TIME SPENT WITH PATIENT: 17 minutes  Reggie Mcgrath RD, [facility], [place]                                                       "

## 2021-11-11 ENCOUNTER — VIRTUAL VISIT (OUTPATIENT)
Dept: SURGERY | Facility: CLINIC | Age: 36
End: 2021-11-11
Payer: COMMERCIAL

## 2021-11-11 VITALS — HEIGHT: 62 IN | BODY MASS INDEX: 47.84 KG/M2 | WEIGHT: 260 LBS

## 2021-11-11 DIAGNOSIS — E66.01 MORBID OBESITY (H): Primary | ICD-10-CM

## 2021-11-11 DIAGNOSIS — Z86.69 HISTORY OF MIGRAINE HEADACHES: ICD-10-CM

## 2021-11-11 PROBLEM — Z90.710 S/P LAPAROSCOPIC HYSTERECTOMY: Status: ACTIVE | Noted: 2020-06-29

## 2021-11-11 PROCEDURE — 99215 OFFICE O/P EST HI 40 MIN: CPT | Mod: GT | Performed by: FAMILY MEDICINE

## 2021-11-11 RX ORDER — PHENTERMINE HYDROCHLORIDE 37.5 MG/1
TABLET ORAL
Qty: 90 TABLET | Refills: 0 | Status: SHIPPED | OUTPATIENT
Start: 2021-11-11 | End: 2022-07-13

## 2021-11-11 RX ORDER — PHENTERMINE HYDROCHLORIDE 37.5 MG/1
TABLET ORAL
Qty: 90 TABLET | Refills: 0 | Status: SHIPPED | OUTPATIENT
Start: 2021-11-11 | End: 2021-11-11

## 2021-11-11 ASSESSMENT — MIFFLIN-ST. JEOR: SCORE: 1832.6

## 2021-11-11 NOTE — PATIENT INSTRUCTIONS

## 2021-11-11 NOTE — PROGRESS NOTES
Kena is a 36 year old who is being evaluated via a billable video visit.      If the video visit is dropped, the invitation should be resent by: Text to cell phone: [phone]  Will anyone else be joining your video visit? No      Video-Visit Details    Type of service:  Video Visit    Video Start Time: 1:06 PM    Video End Time:1:24 PM        Originating Location (pt. Location): Home    Distant Location (provider location):  [facility]     Platform used for Video Visit: [facility]        Bariatric Care Clinic Pre Surgical Weight loss Visit   Date of visit: 11/11/2021  Physician: ALANA Tim MD, MD  Primary Care is Clinic, [facility].  Kena Tamayo   36 year old  female     Initial Weight: 251#  Goal weight prior to surgery 246#  Today's Weight:   Wt Readings from Last 1 Encounters:   11/11/21 117.9 kg (260 lb)     Body mass index is 47.55 kg/m .           Assessment and Plan   Assessment: Kena is a 36 year old year old female who presents for medical weight management.      Plan:    1. Morbid obesity (H)  We discussed healthy habits to assist with weight loss. She will work on getting adequate protein. She has started back at the gym.  We discussed medication that may assist with weight loss. Phentermine was prescribed. Risks/ benefits and possible side effects were discussed and questions were answered. Written information was given. Bariatric surgery is on hold for now given recent mental health crisis. She is aware that phentermine could possibly trigger her anxiety and will stop it if that happens. I recommended she get the nexplanon removed as she has had a hysterectomy.    2. History of migraine headaches  This may improve with healthy habits and weight loss.    Follow up monthly with our dietician and in 3 months with a provider           INTERIM HISTORY  Patient has gained weight while preparing for bariatric surgery. She has a weight goal of  "246# prior to surgery.  Patient is on FMLA from work due to a \"mental breakdown.\" She feel like her stress was related to work and does not want to take a break from the surgical weight loss program.Follows a lactoovegetaian diet. She is seeing her psychologist. She is looking for a new job.       DIETARY HISTORY  Patient has been working with our dietician.     Positive Changes Since Last Visit: She is eating more fruit, she is eating less carbs,  Struggling With: snacking- she said she was told to do this    Knowledgeable in Reading Food Labels: not sure  Getting Adequate Protein: no- eats eggs and beans only, protein shakes once a day  Sleeping 7-8 hours/day not discussed  Stress management seeing her psychologist    PHYSICAL ACTIVITY PATTERNS:  Going to gym- elilptical 30 minutes and weights, now going 4-5 days per week (just started last week)    REVIEW OF SYSTEMS  GENERAL/CONSTITUTIONAL:  Fatigue: no  HEENT:  Vision changes, glaucoma: no  CARDIOVASCULAR:  Chest Pain with Exertion: no  PULMONARY:  Dyspnea on exertion: sometimes (asthma)  PSYCHIATRIC:  Moods: depression and anxiety (mental breakdown)  ENDOCRINE:  Monitoring Blood Sugars: na  Sugars Well Controlled: na  No personal or family history of medullary thyroid cancer no  :  Birth control: hysterectomy (nexplanon is in place because she is afraid to have it taken out)       Patient Profile   Social History     Social History Narrative     Not on file        Past Medical History   Past Medical History:   Diagnosis Date     Anxiety      Asthma      Bipolar 1 disorder (H)      Depression      Depressive disorder      Patient Active Problem List   Diagnosis     Nexplanon in place     Cervical cancer screening     Menorrhagia with regular cycle     Dysmenorrhea     Morbid obesity with body mass index (BMI) of 45.0 to 49.9 in adult (H)     Atypical depressive disorder     Anxiety disorder     Bipolar 1 disorder (H)     Asthma     Migraines     Vegetarian " "diet     Allergic rhinitis     S/P laparoscopic hysterectomy       Past Surgical History  She has a past surgical history that includes wisfom teeth; pe tubes; and hysterectomy, pap no longer indicated.     Examination   Ht 1.575 m (5' 2\")   Wt 117.9 kg (260 lb)   LMP 04/01/2020 (Approximate)   BMI 47.55 kg/m    GENERAL: Healthy, alert and no distress  EYES: Eyes grossly normal to inspection.  No discharge or erythema, or obvious scleral/conjunctival abnormalities.  RESP: No audible wheeze, cough, or visible cyanosis.  No visible retractions or increased work of breathing.    SKIN: Visible skin clear. No significant rash, abnormal pigmentation or lesions.  NEURO: Cranial nerves grossly intact.  Mentation and speech appropriate for age.  PSYCH: Mentation appears normal, affect normal/bright, judgement and insight intact, normal speech and appearance well-groomed.       Counseling:   We reviewed the important post op bariatric recommendations:  -eating 3 meals daily  -eating protein first, getting >60gm protein daily  -eating slowly, chewing food well  -avoiding/limiting calorie containing beverages  -limiting starchy vegetables and carbohydrates, choosing wheat, not white with breads,   crackers, pastas, edison, bagels, tortillas, rice  -limiting restaurant or cafeteria eating to twice a week or less    We discussed the importance of restorative sleep and stress management in maintaining a healthy weight.  We discussed the National Weight Control Registry healthy weight maintenance strategies and ways to optimize metabolism.  We discussed the importance of physical activity including cardiovascular and strength training in maintaining a healthier weight.    Total time spent on the date of this encounter doing: chart review, review of test results, patient visit, physical exam, education, counseling, developing plan of care and documenting = 56 minutes.         ALANA Tim MD  [facility] " Clinic

## 2021-12-14 ENCOUNTER — LAB (OUTPATIENT)
Dept: LAB | Facility: CLINIC | Age: 36
End: 2021-12-14
Payer: COMMERCIAL

## 2021-12-14 DIAGNOSIS — Z79.899 HIGH RISK MEDICATION USE: Primary | ICD-10-CM

## 2021-12-14 DIAGNOSIS — Z79.899 HIGH RISK MEDICATION USE: ICD-10-CM

## 2021-12-14 DIAGNOSIS — F31.81 BIPOLAR II DISORDER (H): ICD-10-CM

## 2021-12-14 DIAGNOSIS — R53.83 FATIGUE, UNSPECIFIED TYPE: ICD-10-CM

## 2021-12-14 LAB
ANION GAP SERPL CALCULATED.3IONS-SCNC: 9 MMOL/L (ref 3–14)
BASOPHILS # BLD AUTO: 0 10E3/UL (ref 0–0.2)
BASOPHILS NFR BLD AUTO: 0 %
BUN SERPL-MCNC: 8 MG/DL (ref 7–30)
CALCIUM SERPL-MCNC: 9 MG/DL (ref 8.5–10.1)
CHLORIDE BLD-SCNC: 104 MMOL/L (ref 94–109)
CHOLEST SERPL-MCNC: 180 MG/DL
CO2 SERPL-SCNC: 25 MMOL/L (ref 20–32)
CREAT SERPL-MCNC: 0.7 MG/DL (ref 0.52–1.04)
EOSINOPHIL # BLD AUTO: 0.1 10E3/UL (ref 0–0.7)
EOSINOPHIL NFR BLD AUTO: 2 %
ERYTHROCYTE [DISTWIDTH] IN BLOOD BY AUTOMATED COUNT: 16.6 % (ref 10–15)
FASTING STATUS PATIENT QL REPORTED: YES
FERRITIN SERPL-MCNC: 19 NG/ML (ref 12–150)
FOLATE SERPL-MCNC: 20.1 NG/ML
GFR SERPL CREATININE-BSD FRML MDRD: >90 ML/MIN/1.73M2
GLUCOSE BLD-MCNC: 90 MG/DL (ref 70–99)
HCT VFR BLD AUTO: 33 % (ref 35–47)
HDLC SERPL-MCNC: 46 MG/DL
HGB BLD-MCNC: 10.7 G/DL (ref 11.7–15.7)
IRON SATN MFR SERPL: 8 % (ref 15–46)
IRON SERPL-MCNC: 33 UG/DL (ref 35–180)
LDLC SERPL CALC-MCNC: 116 MG/DL
LYMPHOCYTES # BLD AUTO: 2.1 10E3/UL (ref 0.8–5.3)
LYMPHOCYTES NFR BLD AUTO: 28 %
MCH RBC QN AUTO: 24.5 PG (ref 26.5–33)
MCHC RBC AUTO-ENTMCNC: 32.4 G/DL (ref 31.5–36.5)
MCV RBC AUTO: 76 FL (ref 78–100)
MONOCYTES # BLD AUTO: 0.4 10E3/UL (ref 0–1.3)
MONOCYTES NFR BLD AUTO: 5 %
NEUTROPHILS # BLD AUTO: 4.8 10E3/UL (ref 1.6–8.3)
NEUTROPHILS NFR BLD AUTO: 65 %
NONHDLC SERPL-MCNC: 134 MG/DL
PLATELET # BLD AUTO: 435 10E3/UL (ref 150–450)
POTASSIUM BLD-SCNC: 3.5 MMOL/L (ref 3.4–5.3)
RBC # BLD AUTO: 4.36 10E6/UL (ref 3.8–5.2)
SODIUM SERPL-SCNC: 138 MMOL/L (ref 133–144)
TIBC SERPL-MCNC: 405 UG/DL (ref 240–430)
TRIGL SERPL-MCNC: 88 MG/DL
TSH SERPL DL<=0.005 MIU/L-ACNC: 1.11 MU/L (ref 0.4–4)
VIT B12 SERPL-MCNC: 387 PG/ML (ref 193–986)
WBC # BLD AUTO: 7.4 10E3/UL (ref 4–11)

## 2021-12-14 PROCEDURE — 80048 BASIC METABOLIC PNL TOTAL CA: CPT

## 2021-12-14 PROCEDURE — 85025 COMPLETE CBC W/AUTO DIFF WBC: CPT

## 2021-12-14 PROCEDURE — 84443 ASSAY THYROID STIM HORMONE: CPT

## 2021-12-14 PROCEDURE — 83550 IRON BINDING TEST: CPT

## 2021-12-14 PROCEDURE — 82607 VITAMIN B-12: CPT

## 2021-12-14 PROCEDURE — 82306 VITAMIN D 25 HYDROXY: CPT

## 2021-12-14 PROCEDURE — 82746 ASSAY OF FOLIC ACID SERUM: CPT

## 2021-12-14 PROCEDURE — 36415 COLL VENOUS BLD VENIPUNCTURE: CPT

## 2021-12-14 PROCEDURE — 80061 LIPID PANEL: CPT

## 2021-12-14 PROCEDURE — 82728 ASSAY OF FERRITIN: CPT

## 2021-12-15 LAB — DEPRECATED CALCIDIOL+CALCIFEROL SERPL-MC: 23 UG/L (ref 20–75)

## 2022-01-09 ENCOUNTER — HEALTH MAINTENANCE LETTER (OUTPATIENT)
Age: 37
End: 2022-01-09

## 2022-02-02 ENCOUNTER — NURSE TRIAGE (OUTPATIENT)
Dept: NURSING | Facility: CLINIC | Age: 37
End: 2022-02-02
Payer: COMMERCIAL

## 2022-03-29 ENCOUNTER — TELEPHONE (OUTPATIENT)
Dept: FAMILY MEDICINE | Facility: OTHER | Age: 37
End: 2022-03-29
Payer: COMMERCIAL

## 2022-03-29 NOTE — CONFIDENTIAL NOTE
Please call patient. She is due for physical, med check, asthma re-check.     Francesco August PA-C

## 2022-06-02 ENCOUNTER — TELEPHONE (OUTPATIENT)
Dept: SURGERY | Facility: CLINIC | Age: 37
End: 2022-06-02
Payer: COMMERCIAL

## 2022-06-02 NOTE — PROGRESS NOTES
Sent patient video link for scheduled appointment 6/2/2022 at 2:30.  Called patient x 2. Unable to reach patient.  Encouraged patient to contact call center to reschedule appointment.  Noted patient just discharged from ED due to panic attack.  Await rescheduled appointment per patient.    Paul Charlton, RD, LD  [facility] Outpatient Dietitian/Weight Loss Clinic   [phone] (office phone)

## 2022-06-20 NOTE — TELEPHONE ENCOUNTER
Does patient have a Primary Care appointment scheduled in the next 8 weeks? No  If Yes: Stop here, delete below line, sign and done encounter  If No: Continue workflow below   -----------------------------------------------------------------------------------------    Is patient MyChart active?Yes    If No    Open telephone encounter    Document pcquality below    Attempt first call    Postpone x 2 weeks     When postponed encounter comes back to the inbasket,   - Attempt second call if there has been no response or appointment scheduled.  - Sign/Done encounter    If Yes    Check to see if MyChart was read.  o If read   - Document pcquality below  - Sign/done encounter  o If not read  - Call patient x 1, leave message or schedule/complete as directed (Ok to leave a message to read MyChart).   - Document pcquality below  - Sign/done encounter       Patient Quality Outreach    Patient is due for the following:   Physical  - due    NEXT STEPS:   Schedule a yearly physical    Type of outreach:    Phone, left message for patient/parent to call back.      Questions for provider review:         Hali Hammer MA  Chart routed to Care Team.          
Left message for return call  
Left vm to call back to get scheduled. Second outreach made.  
MyChart never read.     Francesco August PA-C      
Sunpreme message sent will check in one week to see if message was read and appt made   
Yes - the patient is able to be screened

## 2022-07-12 ENCOUNTER — TELEPHONE (OUTPATIENT)
Dept: SURGERY | Facility: CLINIC | Age: 37
End: 2022-07-12

## 2022-07-12 NOTE — TELEPHONE ENCOUNTER
Left voicemail message for patient reminding them to complete the new patient questionnaire before their appointment.    Jaja MARTINEZ MA

## 2022-07-13 ENCOUNTER — VIRTUAL VISIT (OUTPATIENT)
Dept: SURGERY | Facility: CLINIC | Age: 37
End: 2022-07-13
Payer: COMMERCIAL

## 2022-07-13 VITALS — HEIGHT: 62 IN | BODY MASS INDEX: 42.88 KG/M2 | WEIGHT: 233 LBS

## 2022-07-13 DIAGNOSIS — D64.9 ANEMIA, UNSPECIFIED TYPE: ICD-10-CM

## 2022-07-13 DIAGNOSIS — E66.01 MORBID OBESITY (H): Primary | ICD-10-CM

## 2022-07-13 DIAGNOSIS — Z86.69 HISTORY OF MIGRAINE HEADACHES: ICD-10-CM

## 2022-07-13 PROCEDURE — 99215 OFFICE O/P EST HI 40 MIN: CPT | Mod: 95 | Performed by: FAMILY MEDICINE

## 2022-07-13 PROCEDURE — 99417 PROLNG OP E/M EACH 15 MIN: CPT | Performed by: FAMILY MEDICINE

## 2022-07-13 NOTE — PROGRESS NOTES
"  Kena is a 36 year old who is being evaluated via a billable video visit.      If the video visit is dropped, the invitation should be resent by: Text to cell phone: [phone]  Will anyone else be joining your video visit? No      Video-Visit Details    Type of service:  Video Visit    Video Start Time: 2:58 PM    Video End Time:3:29 PM        Originating Location (pt. Location): Home    Distant Location (provider location):  [facility] [place]     Platform used for Video Visit: [facility] Bariatric Surgery Consultation Note    2022    RE: Kena Tamayo  MR#: [id number]  : 1985      Referring provider:       2020   Who referred you? Safia Ha MD       Chief Complaint/Reason for visit: evaluation for possible weight loss surgery    Dear Francesco August PA-C (General),    I had the pleasure of seeing your patient, Kena Tamayo, to evaluate her obesity and consider her for possible weight loss surgery. As you know, Kena Tamayo is 36 year old.  She has a height of 5' 2\"[pt reported[, a weight of 233 lbs 0 oz, and calculated Body mass index is 42.62 kg/m .        HISTORY OF PRESENT ILLNESS:  Weight Loss History Reviewed with Patient 2020   How long have you been overweight? Since late 20's to early 40's   What is the most that you have ever weighed? 251   What is the most weight you have lost? 0   I have tried the following methods to lose weight Watching portions or calories, Exercise, Prescription Medications, Physician directed program   I have tried the following weight loss medications? (Check all that apply) Topamax/Topiramate   Have you ever had weight loss surgery? No       CO-MORBIDITIES OF OBESITY INCLUDE:     2022   I have the following health issues associated with obesity: Asthma   I have the following symptoms associated with obesity: Depression, Fatigue       PAST MEDICAL HISTORY:  Past Medical History: "   Diagnosis Date     Anxiety      Asthma      Bipolar 1 disorder (H)      Depression      Depressive disorder        PAST SURGICAL HISTORY:  Past Surgical History:   Procedure Laterality Date     HYSTERECTOMY, PAP NO LONGER INDICATED       PE TUBES      as a child     wisfom teeth         FAMILY HISTORY:   Family History   Problem Relation Age of Onset     Unknown/Adopted Father      Diabetes Daughter        SOCIAL HISTORY:   Social History Questions Reviewed With Patient 11/16/2020   Which best describes your employment status (select all that apply) I work full-time   If you work, what is your occupation?    Which best describes your marital status:    Do you have children? Yes   Who do you have in your support network that can be available to help you for the first 2 weeks after surgery?    Who can you count on for support throughout your weight loss surgery journey? , kids, and mother   Can you afford 3 meals a day?  Yes   Can you afford 50-60 dollars a month for vitamins? Yes       HABITS:     11/16/2020   How often do you drink alcohol? Never   Have you ever used any of the following nicotine products? No   Have you or are you currently using street drugs or prescription strength medication for which you do not have a prescription for? No   Do you have a history of chemical dependency (alcohol or drug abuse)? No       PSYCHOLOGICAL HISTORY:   Psychological History Reviewed With Patient 11/16/2020   Have you ever attempted suicide? Never.   Have you had thoughts of suicide in the past year? No   Have you ever been hospitalized for mental illness or a suicide attempt? Never.   Do you have a history of chronic pain? No   Have you ever been diagnosed with fibromyalgia? No   Are you currently being treated for any of the following? (select all that apply) Depression, Anxiety, Bipolar, I take medication or see a therapist for another mental illness   Are you currently  "seeing a therapist or counselor?  Yes, fairly new   Are you currently seeing a psychiatrist? Yes       ROS:     12/29/2020   Skin:  -   HEENT: -   If you answered yes to missing teeth, please indicate how many: -1 molar on top on both sides   Musculoskeletal: -   Cardiovascular: -   Pulmonary: -   Gastrointestinal: -   Genitourinary: -   Hematological: -no history of blood clots   Neurological: -   Female only: hysterectomy       EATING BEHAVIORS:     11/16/2020   Have you or anyone else thought that you had an eating disorder? No   Do you currently binge eat (eat a large amount of food in a short time)? No   Are you an emotional eater? No   Do you get up to eat after falling asleep? No       EXERCISE:     12/29/2020   How often do you exercise? 1 to 2 times per week   What is the duration of your exercise (in minutes)? 20 Minutes   What types of exercise do you do? walking   What keeps you from being more active?  Pain- low back, improving       MEDICATIONS:  Current Outpatient Medications   Medication Sig Dispense Refill     albuterol (PROAIR HFA/PROVENTIL HFA/VENTOLIN HFA) 108 (90 Base) MCG/ACT inhaler Inhale 1-2 puffs into the lungs every 6 hours 1 Inhaler 0     etonogestrel (IMPLANON/NEXPLANON) 68 MG IMPL 1 each by Subdermal route once       hydrOXYzine (ATARAX) 25 MG tablet TAKE 1-2 TO BY MOUTH AT BEDTIME AS NEEDED SLEEP       lamoTRIgine (LAMICTAL) 200 MG tablet Take 100 mg by mouth       RESTASIS 0.05 % ophthalmic emulsion        traZODone (DESYREL) 50 MG tablet TAKE 1 TO 2 TABLETS BY MOUTH AT BEDTIME AS NEEDED FOR SLEEP         ALLERGIES:  Allergies   Allergen Reactions     Ceclor [Cefaclor] Swelling     Penicillins Rash     Amoxicillin      Urea Rash       LABS/IMAGING/MEDICAL RECORDS REVIEW: hgb 11.1, nl bmp    PHYSICAL EXAM:  Ht 5' 2\" (1.575 m)   Wt 233 lb (105.7 kg)   LMP 04/01/2020 (Approximate)   BMI 42.62 kg/m    GENERAL: Healthy, alert and no distress  EYES: Eyes grossly normal to inspection.  No " discharge or erythema, or obvious scleral/conjunctival abnormalities.  RESP: No audible wheeze, cough, or visible cyanosis.  No visible retractions or increased work of breathing.    SKIN: Visible skin clear. No significant rash, abnormal pigmentation or lesions.  NEURO: Cranial nerves grossly intact.  Mentation and speech appropriate for age.  PSYCH: Mentation appears normal, affect normal/bright, judgement and insight intact, normal speech and appearance well-groomed.    In summary, Kena Tamayo has Class III obesity with a body mass index of Body mass index is 42.62 kg/m . kg/m2 and the comorbidities stated above. She completed an informational seminar and is a candidate for the laparoscopic gastric sleeve.  She will have to complete the following pre-requisites:    Kena has already surpassed her weight loss goal set at visit  11/2020  Achieve clearance from dietitian to see surgeon.  Have preoperative laboratory tests drawn.  Psychological Evaluation with MMPI and clearance for weight loss surgery.  Letter of clearance from the following: psychiatrist, psychologist.    Patient will return in 1-2 months for a face to face visit with me. At that time we will get an official weight and measurements, determine weight loss goals prior to surgery and do a physical exam. We will discuss the available weight loss surgeries including risks and benefits in more detail.    Sincerely,     ALANA Tim MD    Total time spent on the date of this encounter doing: chart review, review of test results, patient visit, physical exam, education, counseling, developing plan of care and documenting = 84 minutes.

## 2022-07-13 NOTE — PATIENT INSTRUCTIONS
Jakub Escudero,  It was nice meeting you today.        Please call Dr. Jim Olmos at [phone] to set up bariatric psychological assessment.       Before being submitted for insurance approval, you will need the following:    -Clearance by the Psychologist  -Clearance by the dietitian  -Routine Health Care Maintenance should be up to date (mammograms yearly after age 50, paps as recommended by your primary provider,  colonoscopy after age 50, earlier if high risk.  -Establish a Primary Care Provider if you do not already have one  -Pre Operative Lab work-ordered today  -Achieve weight loss goals prior to surgery if given  -Structured weight loss visits IF mandated by your insurance carrier  -Surgeon consult  -You will need to be using CPAP for at least one month before surgery if you have sleep apnea. Make sure to bring your CPAP or BiPAP to the hospital at the time of surgery.  -You will need to be tobacco free for 2 months before surgery and remain a non-smoker thereafter. If you are currently smoking or have recently quit, your urine will be evaluated for tobacco metabolites pre-operatively.  -If you are on insulin, you might be referred to an endocrinologist who will manage your insulin during the liquid diet and around the time of surgery. This endocrinologist does not replace your primary provider or your endocrinologist.   -You will need an exercise plan which includes MOVE, ie., walking and MUSCLE, ie.,calisthenics, bands, weight, machines, etc...  ______________________________________________________________________    Remember that after your bariatric surgery, vitamin supplementation is a lifelong need.    You will take:    B-12 1000mcg or higher sublingual (under the tongue) daily or by injection 1-2X/month  D3 5000U daily   Multivitamin containing 18mg of iron twice a day  Calcium citrate 1 or 2 daily  Iron with vitamin C if you are a menstruating female  B Complex 50 mg every day or thiamine 100 mg  once a week may be indicated    To keep your weight off and your vitamin levels up, follow-up is important.    Your labs will be monitored every 6 months for the first two years and yearly thereafter.    To avoid ulcers in your stomach avoid tobacco forever, alcohol in excess, caffeine in excess and anti-inflammatories (NSAIDS)  (Aspirin, Ibuprofen, Naproxen and similar medications). Tylenol is fine.  If you are told by your physician take Aspirin to protect your heart or for another reason, make sure to take omeprazole or similar medication (protonix, nexium, prevacid) to protect your stomach.    Remember that alcohol affects you differently after bariatric surgery. If you have even ONE drink DO NOT DRIVE.

## 2022-07-14 ENCOUNTER — VIRTUAL VISIT (OUTPATIENT)
Dept: SURGERY | Facility: CLINIC | Age: 37
End: 2022-07-14
Payer: COMMERCIAL

## 2022-07-14 PROCEDURE — 97802 MEDICAL NUTRITION INDIV IN: CPT | Mod: 95 | Performed by: DIETITIAN, REGISTERED

## 2022-07-14 NOTE — PATIENT INSTRUCTIONS
Jakub Escudero,    Congratulations on all your hard work with continuing to practice pre surgery diet guidelines.  Here are the goals we discussed:    GOALS:  Start 5000 international unit(s) vitamin D  Resume complete multivitamin and mineral and 6265-6107 mg calcium with vitamin D  Practice avoiding liquids 30 minutes before, during and after meals     Education materials for surgery:    Protein Sources for Weight Loss  [email or web address]     Diet Guidelines after Weight-loss Surgery  [email or web address]     Your Stage 1 Diet: Clear Liquids  [email or web address]     Your Stage 2 Diet: Low-fat Full Liquids  [email or web address]     Your Stage 3 Diet: Pureed Foods  [email or web address]     Your Stage 4 Diet: Soft Foods  [email or web address]    Your Stage 5 Diet: Regular Foods  [email or web address]    Supplements after Weight Loss Surgery  [email or web address]        Please call [phone] to schedule your RD appointment for next month.    Thanks,    Paul Church, RD, LD  [facility] Outpatient Dietitian/Weight Loss Clinic   [phone] (office phone)

## 2022-07-14 NOTE — PROGRESS NOTES
"Video-Visit Details    Type of service:  Video Visit    Video Start Time (time video started): 8:31    Video End Time (time video stopped): 9:07    Originating Location (pt. Location): Home    Distant Location (provider location):  [facility] [place] -remote     Mode of Communication:  Video Conference via [facility]    New Bariatric Nutrition Consultation Note    Reason For Visit: Nutrition Assessment    Kena Tamayo is a 36 year old presenting today for new bariatric nutrition consult.  Pt is interested in laparoscopic undecided .  Patient is accompanied by self.    Support System Reviewed With Patient 11/16/2020   Who do you have in your support network that can be available to help you for the first 2 weeks after surgery?    Who can you count on for support throughout your weight loss surgery journey? , kids, and mother       ANTHROPOMETRICS:  Estimated body mass index is 42.62 kg/m  as calculated from the following:    Height as of 7/13/22: 1.575 m (5' 2\").    Weight as of 7/13/22: 105.7 kg (233 lb).    Required weight loss goal pre-op-Per Dr. Tim patient has met weight loss goal. Maintain current weight.       11/16/2020   I have tried the following methods to lose weight Watching portions or calories, Exercise, Prescription Medications, Physician directed program       Weight Loss Questions Reviewed With Patient 11/16/2020   How long have you been overweight? Since late 20's to early 40's       SUPPLEMENT INFORMATION:  Calcium; MVI (not taking consistently)     NUTRITION HISTORY:  Recall Diet Questions Reviewed With Patient 11/16/2020   Describe what you typically consume for breakfast (typical or most recent): Eggs, cereal (6:00 during the school year)    Describe what you typically consume for lunch (typical or most recent): fruit and a meal (12:00)   Describe what you typically consume for supper (typical or most recent): meal and fruit (5:30-6:00)  "   Describe what you typically consume as snacks (typical or most recent): Fruit, Cheese, snacky foods   How many ounces of water, or other low calorie drinks, do you drink daily (8 oz=1 glass)? 32 oz   Please indicate the type of milk: 1%   How often do you drink alcohol? Never       Eating Habits 11/16/2020   Do you have any dietary restrictions? Yes   Do you currently binge eat (eat a large amount of food in a short time)? No   Are you an emotional eater? No   Do you get up to eat after falling asleep? No   What foods do you crave? Snacks and carbs       ADDITIONAL INFORMATION:  Patient feels her biggest struggle with food is getting enough protein as she follows a vegetarian diet.  Patient started program back in 2020 and then paused due to life stressors and is now restarting program.  Patient lives with her  and 5 kids.  Patient works as teacher.  Patient has continued to practice eating slowly and chewing foods to applesauce texture.      Lactose intolerance-will try Fairlife milk   Protein sources: peanut butter, peanuts, black beans, eggs, no tofu, wants to try black beans burgers, cottage cheese     Water; occasional Gatorade   No caffeine, no carbornation, no alcohol     Exercises 15-20 minutes every other day     Dining Out History Reviewed With Patient 11/16/2020   How often do you dine out? Rarely.   Where do you dine out? (select all that apply) sit-down restaurants   What types of food do you order when you dine out? pizza, pasta, sandwiches       Physical Activity Reviewed With Patient 12/29/2020   How often do you exercise? 1 to 2 times per week   What is the duration of your exercise (in minutes)? 20 Minutes   What types of exercise do you do? walking   What keeps you from being more active?  Pain       NUTRITION DIAGNOSIS:  Obesity r/t long history of self-monitoring deficit and excessive energy intake aeb BMI >30 kg/m2.    INTERVENTION:  Intervention Provided/Education Provided on post-op  diet guidelines, vitamins/minerals essential post-operatively, GI anatomy of bariatric surgeries, ways to help prepare for post-op diet guidelines pre-operatively and portion/calorie-control, mindful eating. Provided pt with list of goals RD contact information.      Questions Reviewed With Patient 7/12/2022   How ready are you to make changes regarding your weight? Number 1 = Not ready at all to make changes up to 10 = very ready. 9   How confident are you that you can change? 1 = Not confident that you will be successful making changes up to 10 = very confident. 9       Patient Understanding: good  Expected patient engagement: good    GOALS:  Start 5000 international unit(s) vitamin D  Resume complete multivitamin and mineral and 1316-0801 mg calcium with vitamin D  Practice avoiding liquids 30 minutes before, during and after meals     Follow-Up:   Recommend 2-3 follow up visits to assist with lifestyle changes or per insurance.  Patient checking insurance requirements.     Time spent with patient: 36 minutes    Paul Cahrlton, RD, LD  [facility] Outpatient Dietitian/Weight Loss [facility]   [phone] (office phone)

## 2022-08-16 NOTE — OR NURSING
Contacted surgeon's office and spoke with JANICE Brown regarding phone note of 12.14.18 by Demetrice Heard RN. Note states pt needed to make appt for an EMB on Monday 12.17.18, prior to having surgery on 12.21.18.  No appt evident in Epic.  Stephanie will send message sto surgeon and call this PAN RN back with her response.    Self

## 2022-08-25 ENCOUNTER — TELEPHONE (OUTPATIENT)
Dept: SURGERY | Facility: CLINIC | Age: 37
End: 2022-08-25

## 2022-08-25 NOTE — TELEPHONE ENCOUNTER
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   She needs options please...    [phone]  **OK to leave detailed Vm

## 2022-08-25 NOTE — TELEPHONE ENCOUNTER
Left message for patient to call us back tto reschedule for an in person visit. Last visit note beba roberts said the patient should have a face to face visit to get an accurate height/weight/measuremetns and to discuss surgery options.    Jaja Michel MA

## 2022-11-21 ENCOUNTER — HEALTH MAINTENANCE LETTER (OUTPATIENT)
Age: 37
End: 2022-11-21

## 2023-02-15 NOTE — PROGRESS NOTES
Kena is a 37 year old who is being evaluated via a billable video visit.      If the video visit is dropped, the invitation should be resent by: Text to cell phone: [phone]  Will anyone else be joining your video visit? No      Video-Visit Details    Type of service:  Video Visit    Video Start Time: 1:07 PM    Video End Time:1:31 PM    Originating Location (pt. Location): Home    Distant Location (provider location):  [facility] [place]     Platform used for Video Visit: [employer]            2023      Return Medical Weight Management Note     Kena Tamayo  MRN:  [id number]  :  1985    Dear Francesco August PA-C,    I had the pleasure of seeing your patient Kena Tamayo. She is a 37 year old female who I am continuing to see for treatment of obesity related to:       2022   I have the following health issues associated with obesity: Asthma   I have the following symptoms associated with obesity: Depression, Fatigue       Assessment & Plan   Problem List Items Addressed This Visit     Morbid obesity with body mass index (BMI) of 45.0 to 49.9 in adult (H) - Primary     2023 MWL Initial  Wt 245 lbs Topiramate Start (Switch from SWL to MWL)    We discussed healthy habits to assist with weight loss. We discussed medication that may assist with weight loss. Saxenda was prescribed. Risks/ benefits and possible side effects were discussed and questions were answered. Written information was given. PT will check with insurance to see if AOM coverage for GLP-1 available.  Pt is candidate and would prefer to start these.  Will see the dietician. Goals in pt instruction.              Relevant Medications    topiramate (TOPAMAX) 25 MG tablet    Other Relevant Orders    Comprehensive metabolic panel    Hemoglobin A1c    Vitamin D Screen        PATIENT INSTRUCTIONS:  Labs have been ordered for you. You can have these drawn at any [facility] lab.  Please call  [phone] to set up a lab appt. Results will post to [employer] when complete.  Once all completed, I will review, and write you a note explaining what we find.    Call insurance to see if SAXENDA 3 mg OR WEGOVY 2.4 mg are covered.     Goals:  Make appt to see dietician to work on preparing an easy lunch to have at work or planned snack before your come home from work    FOLLOW-UP:    Please call [phone] to schedule your next visit in 3 months.     30 minutes spent on the date of the encounter doing chart review, history and exam, result review, counseling, developing plan of care, documentation, and further activities as noted      INTERVAL HISTORY:  Kena returns for medical weight management follow up.  Last seen in July 2022 by Dr. Tim.  She was preparing for bariatric surgery but has decided instead to do MWM. Bariatric surgery is very time consuming.  She has 5 kids and is a  at a day treatment program -third. Has been on phentermine in the past did not think that was very helpful.      WEIGHT METRICS:  Body mass index is 44.81 kg/m .   Current Weight: 245 lb (111.1 kg) (pt reported)  Last Visits Weight: 233 lb (105.7 kg)  Initial Weight (lbs): 251 lbs  Cumulative weight loss (lbs): 6  Weight Loss Percentage: 2.39%    Wt Readings from Last 10 Encounters:   02/21/23 245 lb (111.1 kg)   07/13/22 233 lb (105.7 kg)   11/11/21 260 lb (117.9 kg)   10/04/21 260 lb (117.9 kg)   01/05/21 250 lb (113.4 kg)   01/05/21 250 lb (113.4 kg)   11/19/20 251 lb (113.9 kg)   11/17/20 251 lb (113.9 kg)   11/03/20 250 lb (113.4 kg)   11/09/18 208 lb (94.3 kg)        Weight Loss Medication History Reviewed With Patient 2/21/2023   Which weight loss medications are you currently taking on a regular basis?  None   Are you having any side effects from the weight loss medication that we have prescribed you? No     Notices she snacks between meals.  Is a boredom eater usually after work  when she gets home.  Usually this is fruit snacks or cookies.  Doesn't eat much when at school for lunch because she is so busy.      Changes and Difficulties 2/21/2023   I have made the following changes to my diet since my last visit: Eating less   With regards to my diet, I am still struggling with: Losing weight   I have made the following changes to my activity/exercise since my last visit: Some more exercise   With regards to my activity/exercise, I am still struggling with: The need to want to exercise       ROS:    HEENT  H/O glaucoma:  no  Cardiovascular  CAD:   no  Palpitations:   no  HTN:    no  Gastrointestinal  GERD:   no  Constipation:   no  Gastroparesis:  no  Liver Dz:   no  H/O Pancreatitis:  no  H/O Gallbladder Dz: no  Psychiatric  Moods Stable:  yes  Bipolar:  yes  H/O ETOH/Drug Use no  H/O eating disorder: no  Endocrine  PMH/FMH of MTC or MEN2:  no  Neurologic:  H/O seizures:   no  Headaches:  no  Memory Impairment:  no    H/O kidney stones:  no  Kidney disease:  no  Current birth control:  S/P hyst.        MEDICATIONS:   Current Outpatient Medications   Medication Sig Dispense Refill     albuterol (PROAIR HFA/PROVENTIL HFA/VENTOLIN HFA) 108 (90 Base) MCG/ACT inhaler Inhale 1-2 puffs into the lungs every 6 hours 1 Inhaler 0     DULoxetine (CYMBALTA) 30 MG capsule 90 mg       DULoxetine (CYMBALTA) 60 MG capsule TAKE ONE CAPSULE BY MOUTH EVERY MORNING ALONG WITH 30MG CAPSULE FOR A DAILY TOTAL OF 90MG       etonogestrel (IMPLANON/NEXPLANON) 68 MG IMPL 1 each by Subdermal route once       hydrOXYzine (ATARAX) 25 MG tablet TAKE 1-2 TO BY MOUTH AT BEDTIME AS NEEDED SLEEP       ibuprofen (ADVIL/MOTRIN) 800 MG tablet Take 800 mg by mouth every 8 hours as needed       lamoTRIgine (LAMICTAL) 200 MG tablet Take 100 mg by mouth       propranolol (INDERAL) 20 MG tablet Take 1 tablet by mouth 2 times daily       QUEtiapine (SEROQUEL) 50 MG tablet        RESTASIS 0.05 % ophthalmic emulsion        topiramate  (TOPAMAX) 25 MG tablet Take 25 mg week, increase to 50 mg week 2, then increase to 75 mg week 3 and beyond 90 tablet 2     traZODone (DESYREL) 50 MG tablet TAKE 1 TO 2 TABLETS BY MOUTH AT BEDTIME AS NEEDED FOR SLEEP     alabsorption syndrome    LABS:  Hemoglobin A1C   Date Value Ref Range Status   01/20/2021 5.3 0 - 5.6 % Final     Comment:     Normal <5.7% Prediabetes 5.7-6.4%  Diabetes 6.5% or higher - adopted from ADA   consensus guidelines.       Vitamin D Deficiency screening   Date Value Ref Range Status   01/20/2021 37 20 - 75 ug/L Final     Comment:     Season, race, dietary intake, and treatment affect the concentration of   25-hydroxy-Vitamin D. Values may decrease during winter months and increase   during summer months. Values 20-29 ug/L may indicate Vitamin D insufficiency   and values <20 ug/L may indicate Vitamin D deficiency.  Vitamin D determination is routinely performed by an immunoassay specific for   25 hydroxyvitamin D3.  If an individual is on vitamin D2 (ergocalciferol)   supplementation, please specify 25 OH vitamin D2 and D3 level determination by   LCMSMS test [id number].       Vitamin D, Total (25-Hydroxy)   Date Value Ref Range Status   12/14/2021 23 20 - 75 ug/L Final     TSH   Date Value Ref Range Status   12/14/2021 1.11 0.40 - 4.00 mU/L Final     Sodium   Date Value Ref Range Status   12/14/2021 138 133 - 144 mmol/L Final   01/20/2021 135 133 - 144 mmol/L Final     Potassium   Date Value Ref Range Status   12/14/2021 3.5 3.4 - 5.3 mmol/L Final   01/20/2021 4.1 3.4 - 5.3 mmol/L Final     Chloride   Date Value Ref Range Status   12/14/2021 104 94 - 109 mmol/L Final   01/20/2021 104 94 - 109 mmol/L Final     Carbon Dioxide   Date Value Ref Range Status   01/20/2021 27 20 - 32 mmol/L Final     Carbon Dioxide (CO2)   Date Value Ref Range Status   12/14/2021 25 20 - 32 mmol/L Final     Anion Gap   Date Value Ref Range Status   12/14/2021 9 3 - 14 mmol/L Final   01/20/2021 4 3 - 14 mmol/L Final      Glucose   Date Value Ref Range Status   12/14/2021 90 70 - 99 mg/dL Final   01/20/2021 88 70 - 99 mg/dL Final     Urea Nitrogen   Date Value Ref Range Status   12/14/2021 8 7 - 30 mg/dL Final   01/20/2021 9 7 - 30 mg/dL Final     Creatinine   Date Value Ref Range Status   12/14/2021 0.70 0.52 - 1.04 mg/dL Final   01/20/2021 0.70 0.52 - 1.04 mg/dL Final     GFR Estimate   Date Value Ref Range Status   12/14/2021 >90 >60 mL/min/1.73m2 Final     Comment:     As of July 11, 2021, eGFR is calculated by the CKD-EPI creatinine equation, without race adjustment. eGFR can be influenced by muscle mass, exercise, and diet. The reported eGFR is an estimation only and is only applicable if the renal function is stable.   01/20/2021 >90 >60 mL/min/[1.73_m2] Final     Comment:     Non  GFR Calc  Starting 12/18/2018, serum creatinine based estimated GFR (eGFR) will be   calculated using the Chronic Kidney Disease Epidemiology Collaboration   (CKD-EPI) equation.       Calcium   Date Value Ref Range Status   12/14/2021 9.0 8.5 - 10.1 mg/dL Final   01/20/2021 9.2 8.5 - 10.1 mg/dL Final     Bilirubin Total   Date Value Ref Range Status   01/20/2021 0.3 0.2 - 1.3 mg/dL Final     Alkaline Phosphatase   Date Value Ref Range Status   01/20/2021 103 40 - 150 U/L Final     ALT   Date Value Ref Range Status   01/20/2021 23 0 - 50 U/L Final     AST   Date Value Ref Range Status   01/20/2021 16 0 - 45 U/L Final     Cholesterol   Date Value Ref Range Status   12/14/2021 180 <200 mg/dL Final   01/20/2021 164 <200 mg/dL Final     HDL Cholesterol   Date Value Ref Range Status   01/20/2021 44 (L) >49 mg/dL Final     Direct Measure HDL   Date Value Ref Range Status   12/14/2021 46 (L) >=50 mg/dL Final     LDL Cholesterol Calculated   Date Value Ref Range Status   12/14/2021 116 (H) <=100 mg/dL Final   01/20/2021 89 <100 mg/dL Final     Comment:     Desirable:       <100 mg/dl     Triglycerides   Date Value Ref Range Status  "  12/14/2021 88 <150 mg/dL Final   01/20/2021 154 (H) <150 mg/dL Final     Comment:     Borderline high:  150-199 mg/dl  High:             200-499 mg/dl  Very high:       >499 mg/dl       WBC   Date Value Ref Range Status   05/05/2021 9.5 4.0 - 11.0 10e9/L Final     WBC Count   Date Value Ref Range Status   12/14/2021 7.4 4.0 - 11.0 10e3/uL Final     Hemoglobin   Date Value Ref Range Status   12/14/2021 10.7 (L) 11.7 - 15.7 g/dL Final   05/05/2021 10.5 (L) 11.7 - 15.7 g/dL Final     Hematocrit   Date Value Ref Range Status   12/14/2021 33.0 (L) 35.0 - 47.0 % Final   05/05/2021 33.3 (L) 35.0 - 47.0 % Final     MCV   Date Value Ref Range Status   12/14/2021 76 (L) 78 - 100 fL Final   05/05/2021 77 (L) 78 - 100 fl Final     Platelet Count   Date Value Ref Range Status   12/14/2021 435 150 - 450 10e3/uL Final   05/05/2021 416 150 - 450 10e9/L Final         BP Readings from Last 6 Encounters:   01/05/21 121/81   11/17/20 124/84   11/03/20 120/84   11/09/18 119/84   11/07/18 116/75   10/01/18 128/81       Pulse Readings from Last 6 Encounters:   01/05/21 98   11/03/20 83   11/09/18 89   11/07/18 77   10/01/18 117   12/14/17 72       PE:  Ht 5' 2\" (1.575 m)   Wt 245 lb (111.1 kg)   LMP 04/01/2020 (Approximate)   BMI 44.81 kg/m    GENERAL: Healthy, alert and no distress  EYES: Eyes grossly normal to inspection.  No discharge or erythema, or obvious scleral/conjunctival abnormalities.  RESP: No audible wheeze, cough, or visible cyanosis.  No visible retractions or increased work of breathing.    SKIN: Visible skin clear. No significant rash, abnormal pigmentation or lesions.  NEURO: Cranial nerves grossly intact.  Mentation and speech appropriate for age.  PSYCH: Mentation appears normal, affect normal/bright, judgement and insight intact, normal speech and appearance well-groomed.      Sincerely,      Rosalinda Escobar PA-C        "

## 2023-02-21 ENCOUNTER — VIRTUAL VISIT (OUTPATIENT)
Dept: SURGERY | Facility: CLINIC | Age: 38
End: 2023-02-21
Payer: COMMERCIAL

## 2023-02-21 VITALS — WEIGHT: 245 LBS | BODY MASS INDEX: 45.08 KG/M2 | HEIGHT: 62 IN

## 2023-02-21 DIAGNOSIS — E66.01 MORBID OBESITY WITH BODY MASS INDEX (BMI) OF 45.0 TO 49.9 IN ADULT (H): Primary | ICD-10-CM

## 2023-02-21 PROCEDURE — 99214 OFFICE O/P EST MOD 30 MIN: CPT | Mod: VID | Performed by: PHYSICIAN ASSISTANT

## 2023-02-21 RX ORDER — DULOXETIN HYDROCHLORIDE 60 MG/1
CAPSULE, DELAYED RELEASE ORAL
COMMUNITY
Start: 2022-12-31

## 2023-02-21 RX ORDER — PROPRANOLOL HYDROCHLORIDE 20 MG/1
1 TABLET ORAL
COMMUNITY
Start: 2022-12-26

## 2023-02-21 RX ORDER — TOPIRAMATE 25 MG/1
TABLET, FILM COATED ORAL
Qty: 90 TABLET | Refills: 2 | Status: SHIPPED | OUTPATIENT
Start: 2023-02-21 | End: 2023-12-14

## 2023-02-21 RX ORDER — DULOXETIN HYDROCHLORIDE 30 MG/1
90 CAPSULE, DELAYED RELEASE ORAL
COMMUNITY

## 2023-02-21 RX ORDER — QUETIAPINE FUMARATE 50 MG/1
TABLET, FILM COATED ORAL
COMMUNITY
Start: 2022-11-30 | End: 2023-12-14

## 2023-02-21 RX ORDER — IBUPROFEN 800 MG/1
800 TABLET, FILM COATED ORAL EVERY 8 HOURS PRN
COMMUNITY
Start: 2022-11-26

## 2023-02-21 NOTE — PATIENT INSTRUCTIONS
"Nice to talk with you today. Thank you for allowing me the privilege of caring for you. We hope we provided you with the excellent service you deserve.     To ensure the quality of our services you may receive a patient satisfaction survey from an independent monitoring company.  The greatest compliment you can give is \"Likely to Recommend\"    Below is our plan we discussed.-  ROGERS Tellez      Plan:  Start Topiramate (Remember to drink plenty of fluids daily)   Week 1:Take 1 tablet (25 mg) at bedtime  Week 2 Take 2 tablets (50 mg) at bedtime  Week 3:Take 3 tablets (75 mg) at bedtime Stay at 3 tabs until you are seen again.     Labs have been ordered for you. You can have these drawn at any [facility] lab.  Please call [phone] to set up a lab appt. Results will post to [facility] when complete.  Once all completed, I will review, and write you a note explaining what we find.    Call insurance to see if SAXENDA 3 mg OR WEGOVY 2.4 mg are covered.     Goals:  Make appt to see dietician to work on preparing an easy lunch to have at work or planned snack before your come home from work    FOLLOW-UP:    Please call [phone] to schedule your next visit in 3 months.     " [address] [facility] Arthro[facility]: Posterior Cruciate Retaining       Jaiden Meadows   : 1949  Medical Record XLICNP:[id number]  Pre-operative Diagnosis:  Primary osteoarthritis of right knee [M17.11]  Post-operative Diagnosis: Primary osteoarthritis of right knee [M17.11]  Location: [address] [place] [address]  Surgeon: Ravinder Jacome MD   Assistant: Jose Carlos Juarez    Anesthesia: Spinal and FNB    Indications: Patient has end stage arthritis. They have tried and failed conservative management. Procedure:Procedure(s) (LRB):  RIGHT PARVEZ KNEE ARTHROPLASTY TOTAL ROBOTIC ASSISTED/ CLAYTON/ ADDUCTOR CANAL BLOCK (Right)            CPT- 10897- Total knee arthroplasty           24258- Other procedures on musculoskeletal system            0055T- Computer assisted surgical navigation   The complexity of the total joint surgery requires the use of a first assistant for positioning, retraction and expertise in closure. Tourniquet Time: 0 minutes  EBL: 250 cc  Findings: severe degenerative arthritis with loss of cartilage in weight bearing compartments of the knee,  patellar osteophytes with loss of the patella cartilage, posterior femoral osteophytes   BMI: Body mass index is 27.69 kg/m². Vesta Glover was brought to the operating room and positioned on the operating table. He was anesthestized with anesthesia. IV antibiotics were administered. Prior to the incision being made a timeout was called identifying the patient, procedure ,operative side and surgeon The operative leg was prepped and draped in the usual sterile manner. An anterior longitudinal incision was accomplished just medial to the tibial tubercle and extending approximal 6 centimeters proximal to the superior pole of the patella. A medial parapatellar capsular incision was performed. The medial capsular flap was elevated around to the insertion of the semimembranous tendon.   The patella was everted and the knee flexed and externally rotated. The medial and external menisci were excised. The lateral half of the fat pad excised and the patella femoral ligament was released. The anterior cruciate ligament was resect and the posterior cruciate ligament was retained. The femoral and tibial arrays were pinned in place and registered with the [place] [address]. The patient landmarks were collected and the tibial and femoral checkpoints were registered and verified. The preresection balancing was performed. The distal femur was addressed first. Utilizing the [facility] robotic arm the distal femoral cut was made. The anterior and posterior cuts were then made. The osteophytes were removed from the tibial and femoral surfaces. The tibia was then addressed. The [employer] robotic arm was then used to make the measured resection of the tibia. The tibia was sized. The tibial base plate was pinned into place with the appropriate external rotation and stem site prepared. A trial femoral component and poly was placed. A preliminary range of motion was accomplished with the trial components. The patient was found to obtain full extension as well as appropriate flexion. The patient's ligaments were stable in flexion and extension to medial and lateral stressing and the alignment was through the appropriate mechanical axis. Additional surgical procedures included: none. The patella was then everted. The bone was resect to accommodate the three peg patellar button. A trial reduction of the patella revealed appropriate tracking through the patellofemoral groove with no lateral retinacular release being accomplished. All trial components were removed. The real implants were opened: Sizes listed below. The knee was irrigated. There were no femoral deficiencies. There were no tibial deficiencies. No augmentation was utilized. The tibial and femoral components were impacted into place.  The patella component was cemented into place. Sumit Lerner knee was placed through range of motion and noted to be stable as mentioned above with the trail components. The wound was dry, therefore no drain was used. The operative knee was injected with 60 cc of Naropin, 10 cc's of morphine and 1 cc of 30 mg of Toradol. The knee was then soaked with a diluted betadine solution for approximately 3 min. This was then thoroughly irrigated. The capsular layer was closed using a #1 PDS suture. Then, 1 gram (100 mg/ml) of Transexamic Acid was injected into the joint space. The subcutaneous layers were closed using 2-0 Stratafix. Finally the skin was closed using 3-0 Vicryl and staples which were applied in occlusive fashion and sterile bandage applied. An Iceman cryo pad was applied on the operative leg. Sponge count and needle counts were correct. Sumit Lerner left the operating room     Implants:   Implant Name Type Inv.  Item Serial No.  Lot No. LRB No. Used Action   CEMENT BNE 20GM HALF DOSE PMMA W/ GENT M VISC RADPQ FAST - [id number]  CEMENT BNE 20GM HALF DOSE PMMA W/ GENT M VISC RADPQ FAST  [employer]_ [id number] Right 1 Implanted   COMPONENT FEM SZ 5 R KNEE CRUCE RET CEMENTLESS BEAD W/ BARRERA - [id number]  COMPONENT FEM SZ 5 R KNEE CRUCE RET CEMENTLESS BEAD W/ BARRERA  [employer] [facility]_ [id number] Right 1 Implanted   BASEPLATE TIB SZ 6 [id number] ML77MM KNEE TRITANIUM 4 CRUCFRM - [id number]  BASEPLATE TIB SZ 6 [id number] ML77MM KNEE TRITANIUM 4 CRUCFRM  [employer] [facility]_ [id number] Right 1 Implanted   COMPONENT PAT [id number] [id number] SUP INFERIOR ASYM TRIATHLON - [id number]  COMPONENT PAT [id number] SUP INFERIOR ASYM TRIATHLON  [employer] HOW_ [id number] Right 1 Implanted   INSERT TIB CS 6 10 MM ARTC KNEE BEAR [employer] TRIATHLON - [id number]  INSERT TIB CS 6 10 MM ARTC KNEE BEAR TECHNOLOGY TRIATHLON  [facility] ORTHOPEDICS HOW_ [id number] Right 1 Implanted         Signed By: Marjorie Bustamante MD   7/23/2021,  9:44 AM

## 2023-02-21 NOTE — ASSESSMENT & PLAN NOTE
2/21/2023 MWL Initial  Wt 245 lbs Topiramate Start (Switch from SWL to MWL)    We discussed healthy habits to assist with weight loss. We discussed medication that may assist with weight loss. Saxenda was prescribed. Risks/ benefits and possible side effects were discussed and questions were answered. Written information was given. PT will check with insurance to see if AOM coverage for GLP-1 available.  Pt is candidate and would prefer to start these.  Will see the dietician. Goals in pt instruction.

## 2023-02-28 ENCOUNTER — TELEPHONE (OUTPATIENT)
Dept: SURGERY | Facility: CLINIC | Age: 38
End: 2023-02-28
Payer: COMMERCIAL

## 2023-02-28 NOTE — TELEPHONE ENCOUNTER
Prior Authorization Retail Medication Request    Medication/Dose:  Saxenda 18MG/3ML Pen INJ    Insurance Name:  [facility]  Insurance ID:  [id number]      Pharmacy Information (if different than what is on RX)  [id number] - [place] - [address] AT [facility] [address]

## 2023-03-02 NOTE — TELEPHONE ENCOUNTER
PA Initiation    Medication: Saxenda 18MG/3ML Pen INJ  Insurance Company: [employer] [place] - Phone [phone] Fax [phone]  Pharmacy Filling the Rx: [facility] #[id number] [place], [place] - [address] AT [facility] [address]  Filling Pharmacy Phone: [phone]  Filling Pharmacy Fax: [phone]  Start Date: 3/2/2023    KEVIN LORA (Collins: [id number])

## 2023-03-03 NOTE — TELEPHONE ENCOUNTER
Prior Authorization Approval    Authorization Effective Date: 3/3/2023  Authorization Expiration Date: 3/3/2024  Medication: Saxenda 18MG/3ML Pen INJ  Approved Dose/Quantity:   Reference #: [id number]   Insurance Company: [employer] [place] - Phone [phone] Fax [phone]   Which Pharmacy is filling the prescription (Not needed for infusion/clinic administered): [facility] #[id number] [place], [place] - [address] AT [facility] [address] [facility] [address]  Pharmacy Notified: Yes  Patient Notified: Yes

## 2023-04-16 ENCOUNTER — HEALTH MAINTENANCE LETTER (OUTPATIENT)
Age: 38
End: 2023-04-16

## 2023-05-30 NOTE — TELEPHONE ENCOUNTER
Ever since having covid-Peggy doesn't feel good. Getting back to that point again. Coughing went away and it's back and produces phlegm for one week.. Coughing so hard she feels like she could vomit but doesn't. She has asthma and finds when walking from the car into work or back out from work, she has wheezing.  Covid-19 started with runny nose and it's runny again.  I connected with scheduling for an appointment and advised urgent care if they can't get her into the clinic.  Kaitlyn Sow RN  [facility]      Reason for Disposition    [1] Continuous (nonstop) coughing interferes with work or school AND [2] no improvement using cough treatment per Care Advice    Additional Information    Negative: Severe difficulty breathing (e.g., struggling for each breath, speaks in single words)    Negative: Bluish (or gray) lips or face now    Negative: [1] Difficulty breathing AND [2] exposure to flames, smoke, or fumes    Negative: [1] Stridor AND [2] difficulty breathing    Negative: Sounds like a life-threatening emergency to the triager    Negative: [1] Previous asthma attacks AND [2] this feels like asthma attack    Negative: Dry (non-productive) cough (i.e., no sputum or minimal clear sputum)    Negative: Chest pain  (Exception: MILD central chest pain, present only when coughing)    Negative: Difficulty breathing    Negative: Patient sounds very sick or weak to the triager    Negative: [1] Coughed up blood AND [2] > 1 tablespoon (15 ml) (Exception: blood-tinged sputum)    Negative: Fever > 103 F (39.4 C)    Negative: [1] Fever > 101 F (38.3 C) AND [2] age > 60    Negative: [1] Fever > 100.0 F (37.8 C) AND [2] bedridden (e.g., nursing home patient, CVA, chronic illness, recovering from surgery)    Negative: [1] Fever > 100.0 F (37.8 C) AND [2] diabetes mellitus or weak immune system (e.g., HIV positive, cancer chemo, splenectomy, organ transplant, chronic steroids)    Negative: Wheezing is present     Negative: SEVERE coughing spells (e.g., whooping sound after coughing, vomiting after coughing)    Protocols used: COUGH - ACUTE [id number]-J-AC       No

## 2023-09-07 ENCOUNTER — TELEPHONE (OUTPATIENT)
Dept: SURGERY | Facility: CLINIC | Age: 38
End: 2023-09-07
Payer: COMMERCIAL

## 2023-09-07 NOTE — TELEPHONE ENCOUNTER
1st attempt LVM tcb and confirm if we still his PCP   I Called patient at primary phone number and left message to complete questionnaire prior to appointment.   Irma Sheehan, CMA

## 2023-12-14 ENCOUNTER — OFFICE VISIT (OUTPATIENT)
Dept: FAMILY MEDICINE | Facility: OTHER | Age: 38
End: 2023-12-14
Payer: COMMERCIAL

## 2023-12-14 VITALS
HEIGHT: 62 IN | RESPIRATION RATE: 16 BRPM | TEMPERATURE: 98.4 F | DIASTOLIC BLOOD PRESSURE: 80 MMHG | BODY MASS INDEX: 41.96 KG/M2 | SYSTOLIC BLOOD PRESSURE: 124 MMHG | OXYGEN SATURATION: 98 % | HEART RATE: 81 BPM | WEIGHT: 228 LBS

## 2023-12-14 DIAGNOSIS — G43.009 MIGRAINE WITHOUT AURA AND WITHOUT STATUS MIGRAINOSUS, NOT INTRACTABLE: ICD-10-CM

## 2023-12-14 DIAGNOSIS — Z11.59 NEED FOR HEPATITIS C SCREENING TEST: ICD-10-CM

## 2023-12-14 DIAGNOSIS — F31.9 BIPOLAR 1 DISORDER (H): ICD-10-CM

## 2023-12-14 DIAGNOSIS — J35.8 TONSIL STONE: ICD-10-CM

## 2023-12-14 DIAGNOSIS — Z01.818 PREOP GENERAL PHYSICAL EXAM: Primary | ICD-10-CM

## 2023-12-14 DIAGNOSIS — F06.4 ANXIETY DISORDER DUE TO KNOWN PHYSIOLOGICAL CONDITION: ICD-10-CM

## 2023-12-14 DIAGNOSIS — J35.1 HYPERTROPHY OF TONSILS: ICD-10-CM

## 2023-12-14 DIAGNOSIS — Z11.4 SCREENING FOR HIV (HUMAN IMMUNODEFICIENCY VIRUS): ICD-10-CM

## 2023-12-14 LAB
ALBUMIN SERPL BCG-MCNC: 4 G/DL (ref 3.5–5.2)
ALP SERPL-CCNC: 84 U/L (ref 40–150)
ALT SERPL W P-5'-P-CCNC: 11 U/L (ref 0–50)
ANION GAP SERPL CALCULATED.3IONS-SCNC: 13 MMOL/L (ref 7–15)
AST SERPL W P-5'-P-CCNC: 17 U/L (ref 0–45)
BILIRUB SERPL-MCNC: 0.2 MG/DL
BUN SERPL-MCNC: 7.2 MG/DL (ref 6–20)
CALCIUM SERPL-MCNC: 9.1 MG/DL (ref 8.6–10)
CHLORIDE SERPL-SCNC: 104 MMOL/L (ref 98–107)
CREAT SERPL-MCNC: 0.61 MG/DL (ref 0.51–0.95)
DEPRECATED HCO3 PLAS-SCNC: 25 MMOL/L (ref 22–29)
EGFRCR SERPLBLD CKD-EPI 2021: >90 ML/MIN/1.73M2
ERYTHROCYTE [DISTWIDTH] IN BLOOD BY AUTOMATED COUNT: 14.4 % (ref 10–15)
GLUCOSE SERPL-MCNC: 95 MG/DL (ref 70–99)
HCT VFR BLD AUTO: 35.6 % (ref 35–47)
HGB BLD-MCNC: 11.8 G/DL (ref 11.7–15.7)
MCH RBC QN AUTO: 26.7 PG (ref 26.5–33)
MCHC RBC AUTO-ENTMCNC: 33.1 G/DL (ref 31.5–36.5)
MCV RBC AUTO: 81 FL (ref 78–100)
PLATELET # BLD AUTO: 408 10E3/UL (ref 150–450)
POTASSIUM SERPL-SCNC: 3.5 MMOL/L (ref 3.4–5.3)
PROT SERPL-MCNC: 7 G/DL (ref 6.4–8.3)
RBC # BLD AUTO: 4.42 10E6/UL (ref 3.8–5.2)
SODIUM SERPL-SCNC: 142 MMOL/L (ref 135–145)
WBC # BLD AUTO: 8.5 10E3/UL (ref 4–11)

## 2023-12-14 PROCEDURE — 36415 COLL VENOUS BLD VENIPUNCTURE: CPT | Performed by: PHYSICIAN ASSISTANT

## 2023-12-14 PROCEDURE — 85027 COMPLETE CBC AUTOMATED: CPT | Performed by: PHYSICIAN ASSISTANT

## 2023-12-14 PROCEDURE — 80053 COMPREHEN METABOLIC PANEL: CPT | Performed by: PHYSICIAN ASSISTANT

## 2023-12-14 PROCEDURE — 99214 OFFICE O/P EST MOD 30 MIN: CPT | Performed by: PHYSICIAN ASSISTANT

## 2023-12-14 ASSESSMENT — ASTHMA QUESTIONNAIRES: ACT_TOTALSCORE: 24

## 2023-12-14 ASSESSMENT — PAIN SCALES - GENERAL: PAINLEVEL: NO PAIN (0)

## 2023-12-14 NOTE — PROGRESS NOTES
[address]  [facility] [address]  Phone: [phone]  Primary Provider: Francesco August  Pre-op Performing Provider: LIA GARCIA      PREOPERATIVE EVALUATION:  Today's date: 12/14/2023    Kena is a 38 year old, presenting for the following:  Pre-Op Exam        12/14/2023     3:56 PM   Additional Questions   Roomed by Giovanni       Surgical Information:  Surgery/Procedure: Tonsillectomy and adenoidectomy   Surgery Location: [facility]   Surgeon: Naveed Sexton MD   Surgery Date: 2/18/23  Time of Surgery: 2:30 pm   Where patient plans to recover: At home with family  Fax number for surgical facility: Available electronically; [phone]    Assessment & Plan     The proposed surgical procedure is considered INTERMEDIATE risk.    Preop general physical exam  Tonsil stone  Hypertrophy of tonsils  Migraine without aura and without status migrainosus, not intractable  No contraindications to surgical intervention at this point in time from what I can see.  Labs are pending.  Follow-up based on results.  Approved for surgical intervention without further concerns.  - CBC with platelets; Future  - Comprehensive metabolic panel (BMP + Alb, Alk Phos, ALT, AST, Total. Bili, TP); Future    Screening for HIV (human immunodeficiency virus)  Need for hepatitis C screening test  Declined screening consider himself low risk.    Bipolar 1 disorder (H)  Anxiety disorder due to known physiological condition  Under the care of a clinical psychiatrist and weaning off medications related to this and reports no new concerns.  - CBC with platelets; Future  - Comprehensive metabolic panel (BMP + Alb, Alk Phos, ALT, AST, Total. Bili, TP); Future            - No identified additional risk factors other than previously addressed    Antiplatelet or Anticoagulation Medication Instructions:   - Patient is on no antiplatelet or anticoagulation medications.    Additional  Medication Instructions:  Patient is to take all scheduled medications on the day of surgery after surgery is completed    RECOMMENDATION:  APPROVAL GIVEN to proceed with proposed procedure, without further diagnostic evaluation.    Subjective       HPI related to upcoming procedure: History of tonsillar stones in need of intervention related to the tonsillar poles to hopefully stop these from occurring.        12/14/2023     3:34 PM   Preop Questions   1. Have you ever had a heart attack or stroke? No   2. Have you ever had surgery on your heart or blood vessels, such as a stent placement, a coronary artery bypass, or surgery on an artery in your head, neck, heart, or legs? No   3. Do you have chest pain with activity? No   4. Do you have a history of  heart failure? No   5. Do you currently have a cold, bronchitis or symptoms of other infection? No   6. Do you have a cough, shortness of breath, or wheezing? No   7. Do you or anyone in your family have previous history of blood clots? No   8. Do you or does anyone in your family have a serious bleeding problem such as prolonged bleeding following surgeries or cuts? No   9. Have you ever had problems with anemia or been told to take iron pills? No   10. Have you had any abnormal blood loss such as black, tarry or bloody stools, or abnormal vaginal bleeding? No   11. Have you ever had a blood transfusion? No   12. Are you willing to have a blood transfusion if it is medically needed before, during, or after your surgery? Yes   13. Have you or any of your relatives ever had problems with anesthesia? No   14. Do you have sleep apnea, excessive snoring or daytime drowsiness? No   15. Do you have any artifical heart valves or other implanted medical devices like a pacemaker, defibrillator, or continuous glucose monitor? No   16. Do you have artificial joints? No   17. Are you allergic to latex? No   18. Is there any chance that you may be pregnant? No       Health Care  Directive:  Patient does not have a Health Care Directive or Living Will: Discussed advance care planning with patient; however, patient declined at this time.    Preoperative Review of :   reviewed - no record of controlled substances prescribed.    Review of Systems  CONSTITUTIONAL: NEGATIVE for fever, chills, change in weight  INTEGUMENTARY/SKIN: NEGATIVE for worrisome rashes, moles or lesions  EYES: NEGATIVE for vision changes or irritation  ENT/MOUTH: NEGATIVE for ear, mouth and throat problems  RESP: NEGATIVE for significant cough or SOB  CV: NEGATIVE for chest pain, palpitations or peripheral edema  GI: NEGATIVE for nausea, abdominal pain, heartburn, or change in bowel habits  : NEGATIVE for frequency, dysuria, or hematuria  MUSCULOSKELETAL: NEGATIVE for significant arthralgias or myalgia  NEURO: NEGATIVE for weakness, dizziness or paresthesias  ENDOCRINE: NEGATIVE for temperature intolerance, skin/hair changes  HEME: NEGATIVE for bleeding problems  PSYCHIATRIC: NEGATIVE for changes in mood or affect    Patient Active Problem List    Diagnosis Date Noted    Bipolar 1 disorder (H)      Priority: Medium    Morbid obesity with body mass index (BMI) of 45.0 to 49.9 in adult (H) 11/03/2020     Priority: Medium    S/P laparoscopic hysterectomy 06/29/2020     Priority: Medium    Menorrhagia with regular cycle 11/09/2018     Priority: Medium    Dysmenorrhea 11/09/2018     Priority: Medium    Cervical cancer screening      Priority: Medium     2/4/11 NIL pap/neg HR HPV.  8/22/12 NIL pap  4/7/16 NIL pap      Nexplanon in place 12/14/2017     Priority: Medium     Placed 12/14/2017        Atypical depressive disorder 03/17/2017     Priority: Medium    Anxiety disorder 03/17/2017     Priority: Medium    Migraines 10/03/2012     Priority: Medium    Vegetarian diet 08/22/2012     Priority: Medium    Allergic rhinitis 03/26/2012     Priority: Medium      Past Medical History:   Diagnosis Date    Anxiety     Asthma   "   Bipolar 1 disorder (H)     Depression     Depressive disorder     Mild intermittent asthma without complication 07/20/2009    Formatting of this note might be different from the original.  Inhaler prn     Past Surgical History:   Procedure Laterality Date    ABDOMEN SURGERY  April and June 2023    APPENDECTOMY  April 2023    HYSTERECTOMY, PAP NO LONGER INDICATED      PE TUBES      as a child    wisfom teeth       Current Outpatient Medications   Medication Sig Dispense Refill    albuterol (PROAIR HFA/PROVENTIL HFA/VENTOLIN HFA) 108 (90 Base) MCG/ACT inhaler Inhale 1-2 puffs into the lungs every 6 hours 1 Inhaler 0    DULoxetine (CYMBALTA) 30 MG capsule 90 mg      DULoxetine (CYMBALTA) 60 MG capsule TAKE ONE CAPSULE BY MOUTH EVERY MORNING ALONG WITH 30MG CAPSULE FOR A DAILY TOTAL OF 90MG      etonogestrel (IMPLANON/NEXPLANON) 68 MG IMPL 1 each by Subdermal route once      ibuprofen (ADVIL/MOTRIN) 800 MG tablet Take 800 mg by mouth every 8 hours as needed      propranolol (INDERAL) 20 MG tablet Take 1 tablet by mouth 2 times daily      RESTASIS 0.05 % ophthalmic emulsion          Allergies   Allergen Reactions    Ceclor [Cefaclor] Swelling    Penicillins Rash    Amoxicillin     Urea Rash        Social History     Tobacco Use    Smoking status: Never    Smokeless tobacco: Never   Substance Use Topics    Alcohol use: No     Comment: Has not drank in 7 months     Family History   Problem Relation Age of Onset    Unknown/Adopted Father     Diabetes Daughter      History   Drug Use No         Objective     /80   Pulse 81   Temp 98.4  F (36.9  C) (Temporal)   Resp 16   Ht 5' 2\" (1.575 m)   Wt 228 lb (103.4 kg)   LMP 04/01/2020 (Approximate)   SpO2 98%   BMI 41.70 kg/m      Physical Exam    GENERAL APPEARANCE: healthy, alert and no distress     EYES: EOMI,  PERRL     HENT: ear canals and TM's normal and nose and mouth without ulcers or lesions     NECK: no adenopathy, no asymmetry, masses, or scars and " "thyroid normal to palpation     RESP: lungs clear to auscultation - no rales, rhonchi or wheezes     CV: regular rates and rhythm, normal S1 S2, no S3 or S4 and no murmur, click or rub     ABDOMEN:  soft, nontender, no HSM or masses and bowel sounds normal     MS: extremities normal- no gross deformities noted, no evidence of inflammation in joints, FROM in all extremities.     SKIN: no suspicious lesions or rashes     NEURO: Normal strength and tone, sensory exam grossly normal, mentation intact and speech normal     PSYCH: mentation appears normal. and affect normal/bright     LYMPHATICS: No cervical adenopathy    No results for input(s): \"HGB\", \"PLT\", \"INR\", \"NA\", \"POTASSIUM\", \"CR\", \"A1C\" in the last 23779 hours.     Diagnostics:  Labs pending at this time.  Results will be reviewed when available.   No EKG required for low risk surgery (cataract, skin procedure, breast biopsy, etc).    Revised Cardiac Risk Index (RCRI):  The patient has the following serious cardiovascular risks for perioperative complications:   - No serious cardiac risks = 0 points     RCRI Interpretation: 1 point: Class II (low risk - 0.9% complication rate)         Signed Electronically by: Bright Nash PA-C  Copy of this evaluation report is provided to requesting physician.      "

## 2024-05-15 ENCOUNTER — TELEPHONE (OUTPATIENT)
Dept: SURGERY | Facility: CLINIC | Age: 39
End: 2024-05-15
Payer: COMMERCIAL

## 2024-05-15 NOTE — TELEPHONE ENCOUNTER
Called pt re: Appointment  No answer. Left detailed VM informing patient that no new medications can be prescribed without an appointment.  Encouraged patient to remain on fast pass for cancellations.  Asked to call clinic if questions.    Gayle GIVENS RN

## 2024-05-15 NOTE — TELEPHONE ENCOUNTER
General Call    Contacts         Type Contact Phone/Fax    05/15/2024 07:59 AM CDT Phone (Incoming) Kena Tamayo (Self) [phone] (H)          Reason for Call:  call back    What are your questions or concerns:  pt would like to try medication for weight loss. She has an appt in sept and is on the fast pass. She would like a call back to see if she can be prescribed something now      Could we send this information to you in [facility] or would you prefer to receive a phone call?:   Patient would prefer a phone call   Okay to leave a detailed message?: Yes at Cell number on file:    Telephone Information:   Mobile [phone]

## 2024-06-23 ENCOUNTER — HEALTH MAINTENANCE LETTER (OUTPATIENT)
Age: 39
End: 2024-06-23

## 2025-09-03 ENCOUNTER — PATIENT OUTREACH (OUTPATIENT)
Dept: CARE COORDINATION | Facility: CLINIC | Age: 40
End: 2025-09-03
Payer: COMMERCIAL